# Patient Record
Sex: FEMALE | Race: WHITE | Employment: FULL TIME | URBAN - METROPOLITAN AREA
[De-identification: names, ages, dates, MRNs, and addresses within clinical notes are randomized per-mention and may not be internally consistent; named-entity substitution may affect disease eponyms.]

---

## 2019-10-22 ENCOUNTER — HOSPITAL ENCOUNTER (OUTPATIENT)
Dept: MRI IMAGING | Age: 39
Discharge: HOME | End: 2019-10-22
Payer: COMMERCIAL

## 2019-10-22 DIAGNOSIS — S93.699A PLANTAR FASCIA RUPTURE: ICD-10-CM

## 2019-10-22 PROCEDURE — 73718 MRI LOWER EXTREMITY W/O DYE: CPT

## 2019-11-03 ENCOUNTER — APPOINTMENT (OUTPATIENT)
Dept: CT IMAGING | Age: 39
End: 2019-11-03
Attending: EMERGENCY MEDICINE
Payer: COMMERCIAL

## 2019-11-03 ENCOUNTER — HOSPITAL ENCOUNTER (EMERGENCY)
Age: 39
Discharge: HOME | End: 2019-11-03
Attending: EMERGENCY MEDICINE
Payer: COMMERCIAL

## 2019-11-03 VITALS
RESPIRATION RATE: 18 BRPM | HEIGHT: 62 IN | TEMPERATURE: 98.6 F | HEART RATE: 109 BPM | SYSTOLIC BLOOD PRESSURE: 119 MMHG | DIASTOLIC BLOOD PRESSURE: 81 MMHG | BODY MASS INDEX: 26.31 KG/M2 | WEIGHT: 143 LBS | OXYGEN SATURATION: 100 %

## 2019-11-03 DIAGNOSIS — R53.83 FATIGUE, UNSPECIFIED TYPE: ICD-10-CM

## 2019-11-03 DIAGNOSIS — R11.2 NON-INTRACTABLE VOMITING WITH NAUSEA, UNSPECIFIED VOMITING TYPE: Primary | ICD-10-CM

## 2019-11-03 DIAGNOSIS — R51.9 ACUTE NONINTRACTABLE HEADACHE, UNSPECIFIED HEADACHE TYPE: ICD-10-CM

## 2019-11-03 LAB
ANION GAP SERPL CALC-SCNC: 8 MMOL/L (ref 5–15)
BASOPHILS # BLD: 0.1 K/UL (ref 0–0.1)
BASOPHILS NFR BLD: 0 % (ref 0–1)
BUN SERPL-MCNC: 8 MG/DL (ref 6–20)
BUN/CREAT SERPL: 11 (ref 12–20)
CALCIUM SERPL-MCNC: 8.8 MG/DL (ref 8.5–10.1)
CHLORIDE SERPL-SCNC: 110 MMOL/L (ref 97–108)
CO2 SERPL-SCNC: 23 MMOL/L (ref 21–32)
CREAT SERPL-MCNC: 0.7 MG/DL (ref 0.55–1.02)
DIFFERENTIAL METHOD BLD: ABNORMAL
EOSINOPHIL # BLD: 0 K/UL (ref 0–0.4)
EOSINOPHIL NFR BLD: 0 % (ref 0–7)
ERYTHROCYTE [DISTWIDTH] IN BLOOD BY AUTOMATED COUNT: 13.3 % (ref 11.5–14.5)
GLUCOSE SERPL-MCNC: 102 MG/DL (ref 65–100)
HCG UR QL: NEGATIVE
HCT VFR BLD AUTO: 43.8 % (ref 35–47)
HGB BLD-MCNC: 14 G/DL (ref 11.5–16)
IMM GRANULOCYTES # BLD AUTO: 0 K/UL (ref 0–0.04)
IMM GRANULOCYTES NFR BLD AUTO: 0 % (ref 0–0.5)
LYMPHOCYTES # BLD: 1.4 K/UL (ref 0.8–3.5)
LYMPHOCYTES NFR BLD: 13 % (ref 12–49)
MCH RBC QN AUTO: 26.4 PG (ref 26–34)
MCHC RBC AUTO-ENTMCNC: 32 G/DL (ref 30–36.5)
MCV RBC AUTO: 82.5 FL (ref 80–99)
MONOCYTES # BLD: 0.5 K/UL (ref 0–1)
MONOCYTES NFR BLD: 4 % (ref 5–13)
NEUTS SEG # BLD: 9.1 K/UL (ref 1.8–8)
NEUTS SEG NFR BLD: 83 % (ref 32–75)
NRBC # BLD: 0 K/UL (ref 0–0.01)
NRBC BLD-RTO: 0 PER 100 WBC
PLATELET # BLD AUTO: 477 K/UL (ref 150–400)
PMV BLD AUTO: 9.3 FL (ref 8.9–12.9)
POTASSIUM SERPL-SCNC: 3.9 MMOL/L (ref 3.5–5.1)
RBC # BLD AUTO: 5.31 M/UL (ref 3.8–5.2)
SODIUM SERPL-SCNC: 141 MMOL/L (ref 136–145)
WBC # BLD AUTO: 11.1 K/UL (ref 3.6–11)

## 2019-11-03 PROCEDURE — 74011250636 HC RX REV CODE- 250/636: Performed by: NURSE PRACTITIONER

## 2019-11-03 PROCEDURE — 85025 COMPLETE CBC W/AUTO DIFF WBC: CPT

## 2019-11-03 PROCEDURE — 96374 THER/PROPH/DIAG INJ IV PUSH: CPT

## 2019-11-03 PROCEDURE — 36415 COLL VENOUS BLD VENIPUNCTURE: CPT

## 2019-11-03 PROCEDURE — 70450 CT HEAD/BRAIN W/O DYE: CPT

## 2019-11-03 PROCEDURE — 99285 EMERGENCY DEPT VISIT HI MDM: CPT

## 2019-11-03 PROCEDURE — 81025 URINE PREGNANCY TEST: CPT

## 2019-11-03 PROCEDURE — 93005 ELECTROCARDIOGRAM TRACING: CPT

## 2019-11-03 PROCEDURE — 80048 BASIC METABOLIC PNL TOTAL CA: CPT

## 2019-11-03 PROCEDURE — 96375 TX/PRO/DX INJ NEW DRUG ADDON: CPT

## 2019-11-03 PROCEDURE — 74011250636 HC RX REV CODE- 250/636: Performed by: EMERGENCY MEDICINE

## 2019-11-03 PROCEDURE — 74011250637 HC RX REV CODE- 250/637: Performed by: EMERGENCY MEDICINE

## 2019-11-03 RX ORDER — METOPROLOL SUCCINATE 25 MG/1
25 TABLET, EXTENDED RELEASE ORAL
Status: COMPLETED | OUTPATIENT
Start: 2019-11-03 | End: 2019-11-03

## 2019-11-03 RX ORDER — KETOROLAC TROMETHAMINE 10 MG/1
10 TABLET, FILM COATED ORAL
Qty: 12 TAB | Refills: 0 | Status: SHIPPED | OUTPATIENT
Start: 2019-11-03 | End: 2020-11-05

## 2019-11-03 RX ORDER — ONDANSETRON 2 MG/ML
4 INJECTION INTRAMUSCULAR; INTRAVENOUS
Status: COMPLETED | OUTPATIENT
Start: 2019-11-03 | End: 2019-11-03

## 2019-11-03 RX ORDER — ONDANSETRON 4 MG/1
4 TABLET, ORALLY DISINTEGRATING ORAL
Qty: 10 TAB | Refills: 0 | Status: SHIPPED | OUTPATIENT
Start: 2019-11-03 | End: 2020-11-05

## 2019-11-03 RX ORDER — KETOROLAC TROMETHAMINE 30 MG/ML
15 INJECTION, SOLUTION INTRAMUSCULAR; INTRAVENOUS
Status: COMPLETED | OUTPATIENT
Start: 2019-11-03 | End: 2019-11-03

## 2019-11-03 RX ADMIN — KETOROLAC TROMETHAMINE 15 MG: 30 INJECTION, SOLUTION INTRAMUSCULAR at 15:17

## 2019-11-03 RX ADMIN — METOPROLOL SUCCINATE 25 MG: 25 TABLET, EXTENDED RELEASE ORAL at 15:25

## 2019-11-03 RX ADMIN — SODIUM CHLORIDE 1000 ML: 900 INJECTION, SOLUTION INTRAVENOUS at 15:21

## 2019-11-03 RX ADMIN — ONDANSETRON 4 MG: 2 INJECTION INTRAMUSCULAR; INTRAVENOUS at 15:27

## 2019-11-03 NOTE — ED PROVIDER NOTES
42 y.o. female with significant past medical history for SVT who presents non ambulatory to ED with chief complaint of emesis. Pt reports severe N/V since this morning, has had 5 episodes since 1100 this morning. Last emesis episode in ED waiting room. Also associated headache, mild abdominal pain secondary to emesis. Pt works at a  center. Just moved to [place] [address] 3 weeks ago. Denies chance of pregnancy     PCP: None    Note written by Alex Rashid, as dictated by Chucho Hua NP 12:49 PM.     42 y.o. female with past medical history significant for SVT who presents via private vehicle to the ED with chief complaint of vomiting. Patient reports that this morning she began having a worsening headache to the top of her head with no radiation to the neck. Patient states that afterwards, she began feeling nauseous and had 7 episodes of vomiting. Patient states that at baseline, she has frequent fatigue and headaches and that her heart rate is usually fast. She denies any history of migraines and states that she sees a rheumatologist for her chronic symptoms. 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 Patient notes that she has had 3 bowel movements today, but that the stool is fully formed. Patient endorses use of 25 mg metoprolol once a day for SVT, but states that she has not taken her dose for the past three days because she forgot. Patient notes that she also has psoriasis for which she does not take medications. Patient's LMP was 6 months ago and she is compliant with birth control She notes that for the past one month, \"the light has been bothering her eyes\" for about a month. She denies history of blood clots. Patient endorses chills, shakiness, and lightheadedness. Patient denies shortness of breath, fever, diarrhea, dizziness, tick bites, hematemesis or abdominal pain.     There are no other acute medical concerns at this time. Patient just moved to [place] [address] three weeks ago    PCP: None    Note written by Alex Dunn, as dictated by Orlando Daly MD 2:29 PM      The history is provided by the patient and a parent. No  was used. No past medical history on file. No past surgical history on file. No family history on file. Social History     Socioeconomic History    Marital status:      Spouse name: Not on file    Number of children: Not on file    Years of education: Not on file    Highest education level: Not on file   Occupational History    Not on file   Social Needs    Financial resource strain: Not on file    Food insecurity:     Worry: Not on file     Inability: Not on file    Transportation needs:     Medical: Not on file     Non-medical: Not on file   Tobacco Use    Smoking status: Not on file   Substance and Sexual Activity    Alcohol use: Not on file    Drug use: Not on file    Sexual activity: Not on file   Lifestyle    Physical activity:     Days per week: Not on file     Minutes per session: Not on file    Stress: Not on file   Relationships    Social connections:     Talks on phone: Not on file     Gets together: Not on file     Attends Synagogue service: Not on file     Active member of club or organization: Not on file     Attends meetings of clubs or organizations: Not on file     Relationship status: Not on file    Intimate partner violence:     Fear of current or ex partner: Not on file     Emotionally abused: Not on file     Physically abused: Not on file     Forced sexual activity: Not on file   Other Topics Concern    Not on file   Social History Narrative    Not on file         ALLERGIES: Patient has no allergy information on record. Review of Systems   Constitutional: Positive for chills. Respiratory: Negative for shortness of breath. Cardiovascular: Negative for chest pain.    Gastrointestinal: Positive for nausea and vomiting. Neurological: Positive for light-headedness and headaches. Negative for dizziness. All other systems reviewed and are negative. Vitals:    11/03/19 1256   BP: (!) 132/94   Pulse: (!) 115   Resp: 18   Temp: 98.6 °F (37 °C)   SpO2: 100%   Weight: 64.9 kg (143 lb)   Height: 5' 2\" (1.575 m)            Physical Exam   Constitutional: She is oriented to person, place, and time. She appears well-developed and well-nourished. No distress. HENT:   Head: Normocephalic and atraumatic. Right Ear: External ear normal.   Left Ear: External ear normal.   Mouth/Throat: Oropharynx is clear and moist. No oropharyngeal exudate. Eyes: Pupils are equal, round, and reactive to light. EOM are normal. No scleral icterus. Neck: Normal range of motion. Neck supple. No thyromegaly present. Cardiovascular: Regular rhythm, S1 normal, S2 normal, normal heart sounds and intact distal pulses. Tachycardia present. No murmur heard. Pulmonary/Chest: Effort normal and breath sounds normal. No stridor. No respiratory distress. She has no wheezes. She has no rales. Abdominal: Soft. Bowel sounds are normal. She exhibits no distension and no mass. There is no tenderness. There is no guarding. Musculoskeletal: Normal range of motion. She exhibits no edema. Neurological: She is alert and oriented to person, place, and time. No cranial nerve deficit. Coordination normal.   Skin: Skin is warm and dry. No rash noted. She is not diaphoretic. Patchy psoriatic plaques to patients scalp and body    Psychiatric:   anxious   Nursing note and vitals reviewed. Note written by Alex Cox, as dictated by Alessio Forde NP 12:49 PM.     MDM  Number of Diagnoses or Management Options  Acute nonintractable headache, unspecified headache type:    Fatigue, unspecified type:   Non-intractable vomiting with nausea, unspecified vomiting type:   Diagnosis management comments: Given sx of light sensitivity for a month and no fevers not on immunosuppressants doubt meningitis. Could be that pt has migraines and SOSA caused vomiting. 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 Has had more stools than normal today so this could be viral    She has a multitude of complaints including hair loss and not feeling well. She is already seeing her rheumatologist who natalie bw . She moved here from [place] about 3 weeks ago but already has a rheumatologist.  She does not have a primary care however. No cp or sob to be concerned for PE. She reports resting hr always high and she missed her metoprolol the past 3 days which is likely what is causing tachycardia. Will check ekg to ensure sinus        Amount and/or Complexity of Data Reviewed  Clinical lab tests: ordered and reviewed  Tests in the radiology section of CPT®: reviewed and ordered  Obtain history from someone other than the patient: yes (mom)  Independent visualization of images, tracings, or specimens: yes (ekg)    Patient Progress  Patient progress: stable         Procedures    ED EKG interpretation:  Rhythm: sinus tachycardia; and regular . Rate (approx.): 110; Axis: normal; P wave: normal; QRS interval: normal ; ST/T wave: non-specific changes  EKG documented by Tay Brooks MD, scribe, as interpreted by Marilynn Castro MD, ED MD.      PROGRESS NOTE:  4:11 PM  Feels better, headaches are improved and nausea is improved. We discussed at length importance of follow-up. I have given her the list of primary care providers that we have here in the emergency department. I also discussed if her symptoms worsen, her headaches worsen or she develops fevers to return however does not sound like these headaches are new. She is not on any current immunosuppressants for her psoriasis as she had developed lupus due to them a few years ago and everything has been stopped.   She reports however in her most recent blood work done by her rheumatologist, she no longer has lupus. 4:11 PM  Patient's results have been reviewed with them. Patient and/or family have verbally conveyed their understanding and agreement of the patient's signs, symptoms, diagnosis, treatment and prognosis and additionally agree to follow up as recommended or return to the Emergency Room should their condition change prior to follow-up. Discharge instructions have also been provided to the patient with some educational information regarding their diagnosis as well a list of reasons why they would want to return to the ER prior to their follow-up appointment should their condition change. 1. Non-intractable vomiting with nausea, unspecified vomiting type    2. Acute nonintractable headache, unspecified headache type    3.  Fatigue, unspecified type

## 2019-11-03 NOTE — DISCHARGE INSTRUCTIONS
Patient Education        Fatigue: Care Instructions  Your Care Instructions    Fatigue is a feeling of tiredness, exhaustion, or lack of energy. You may feel fatigue because of too much or not enough activity. It can also come from stress, lack of sleep, boredom, and poor diet. Many medical problems, such as viral infections, can cause fatigue. Emotional problems, especially depression, are often the cause of fatigue. Fatigue is most often a symptom of another problem. Treatment for fatigue depends on the cause. For example, if you have fatigue because you have a certain health problem, treating this problem also treats your fatigue. If depression or anxiety is the cause, treatment may help. Follow-up care is a key part of your treatment and safety. Be sure to make and go to all appointments, and call your doctor if you are having problems. It's also a good idea to know your test results and keep a list of the medicines you take. How can you care for yourself at home? · Get regular exercise. But don't overdo it. Go back and forth between rest and exercise. · Get plenty of rest.  · Eat a healthy diet. Do not skip meals, especially breakfast.  · Reduce your use of caffeine, tobacco, and alcohol. Caffeine is most often found in coffee, tea, cola drinks, and chocolate. · Limit medicines that can cause fatigue. This includes tranquilizers and cold and allergy medicines. When should you call for help? Watch closely for changes in your health, and be sure to contact your doctor if:    · You have new symptoms such as fever or a rash.     · Your fatigue gets worse.     · You have been feeling down, depressed, or hopeless. Or you may have lost interest in things that you usually enjoy.     · You are not getting better as expected. Where can you learn more? Go to http://susan-harlan.info/. Enter F240 in the search box to learn more about \"Fatigue: Care Instructions. \"  Current as of: June 26, 2019  Content Version: 12.2  © 9129-4315 [employer]. Care instructions adapted under license by [employer] (which disclaims liability or warranty for this information). If you have questions about a medical condition or this instruction, always ask your healthcare professional. [address] any warranty or liability for your use of this information. Patient Education        Headache: Care Instructions  Your Care Instructions    Headaches have many possible causes. Most headaches aren't a sign of a more serious problem, and they will get better on their own. Home treatment may help you feel better faster. The doctor has checked you carefully, but problems can develop later. If you notice any problems or new symptoms, get medical treatment right away. Follow-up care is a key part of your treatment and safety. Be sure to make and go to all appointments, and call your doctor if you are having problems. It's also a good idea to know your test results and keep a list of the medicines you take. How can you care for yourself at home? · Do not drive if you have taken a prescription pain medicine. · Rest in a quiet, dark room until your headache is gone. Close your eyes and try to relax or go to sleep. Don't watch TV or read. · Put a cold, moist cloth or cold pack on the painful area for 10 to 20 minutes at a time. Put a thin cloth between the cold pack and your skin. · Use a warm, moist towel or a heating pad set on low to relax tight shoulder and neck muscles. · Have someone gently massage your neck and shoulders. · Take pain medicines exactly as directed. ? If the doctor gave you a prescription medicine for pain, take it as prescribed. ? If you are not taking a prescription pain medicine, ask your doctor if you can take an over-the-counter medicine.   · Be careful not to take pain medicine more often than the instructions allow, because you may get worse or more frequent headaches when the medicine wears off. · Do not ignore new symptoms that occur with a headache, such as a fever, weakness or numbness, vision changes, or confusion. These may be signs of a more serious problem. To prevent headaches  · Keep a headache diary so you can figure out what triggers your headaches. Avoiding triggers may help you prevent headaches. Record when each headache began, how long it lasted, and what the pain was like (throbbing, aching, stabbing, or dull). Write down any other symptoms you had with the headache, such as nausea, flashing lights or dark spots, or sensitivity to bright light or loud noise. Note if the headache occurred near your period. List anything that might have triggered the headache, such as certain foods (chocolate, cheese, wine) or odors, smoke, bright light, stress, or lack of sleep. · Find healthy ways to deal with stress. Headaches are most common during or right after stressful times. Take time to relax before and after you do something that has caused a headache in the past.  · Try to keep your muscles relaxed by keeping good posture. Check your jaw, face, neck, and shoulder muscles for tension, and try relaxing them. When sitting at a desk, change positions often, and stretch for 30 seconds each hour. · Get plenty of sleep and exercise. · Eat regularly and well. Long periods without food can trigger a headache. · Treat yourself to a massage. Some people find that regular massages are very helpful in relieving tension. · Limit caffeine by not drinking too much coffee, tea, or soda. But don't quit caffeine suddenly, because that can also give you headaches. · Reduce eyestrain from computers by blinking frequently and looking away from the computer screen every so often. Make sure you have proper eyewear and that your monitor is set up properly, about an arm's length away. · Seek help if you have depression or anxiety.  Your headaches may be linked to these conditions. Treatment can both prevent headaches and help with symptoms of anxiety or depression. When should you call for help? Call 911 anytime you think you may need emergency care. For example, call if:    · You have signs of a stroke. These may include:  ? Sudden numbness, paralysis, or weakness in your face, arm, or leg, especially on only one side of your body. ? Sudden vision changes. ? Sudden trouble speaking. ? Sudden confusion or trouble understanding simple statements. ? Sudden problems with walking or balance. ? A sudden, severe headache that is different from past headaches.    Call your doctor now or seek immediate medical care if:    · You have a new or worse headache.     · Your headache gets much worse. Where can you learn more? Go to http://susan-harlan.info/. Enter M271 in the search box to learn more about \"Headache: Care Instructions. \"  Current as of: March 28, 2019  Content Version: 12.2  © 5096-7941 [employer]. Care instructions adapted under license by [employer] (which disclaims liability or warranty for this information). If you have questions about a medical condition or this instruction, always ask your healthcare professional. [place] [address] any warranty or liability for your use of this information. Patient Education        Nausea and Vomiting: Care Instructions  Your Care Instructions    When you are nauseated, you may feel weak and sweaty and notice a lot of saliva in your mouth. Nausea often leads to vomiting. Most of the time you do not need to worry about nausea and vomiting, but they can be signs of other illnesses. Two common causes of nausea and vomiting are stomach flu and food poisoning. Nausea and vomiting from viral stomach flu will usually start to improve within 24 hours. Nausea and vomiting from food poisoning may last from 12 to 48 hours.   The doctor has checked you carefully, but problems can develop later. If you notice any problems or new symptoms, get medical treatment right away. Follow-up care is a key part of your treatment and safety. Be sure to make and go to all appointments, and call your doctor if you are having problems. It's also a good idea to know your test results and keep a list of the medicines you take. How can you care for yourself at home? · To prevent dehydration, drink plenty of fluids, enough so that your urine is light yellow or clear like water. Choose water and other caffeine-free clear liquids until you feel better. If you have kidney, heart, or liver disease and have to limit fluids, talk with your doctor before you increase the amount of fluids you drink. · Rest in bed until you feel better. · When you are able to eat, try clear soups, mild foods, and liquids until all symptoms are gone for 12 to 48 hours. Other good choices include dry toast, crackers, cooked cereal, and gelatin dessert, such as Jell-O. When should you call for help? Call 911 anytime you think you may need emergency care. For example, call if:    · You passed out (lost consciousness).    Call your doctor now or seek immediate medical care if:    · You have symptoms of dehydration, such as:  ? Dry eyes and a dry mouth. ? Passing only a little dark urine. ? Feeling thirstier than usual.     · You have new or worsening belly pain.     · You have a new or higher fever.     · You vomit blood or what looks like coffee grounds.    Watch closely for changes in your health, and be sure to contact your doctor if:    · You have ongoing nausea and vomiting.     · Your vomiting is getting worse.     · Your vomiting lasts longer than 2 days.     · You are not getting better as expected. Where can you learn more? Go to http://susan-harlan.info/. Enter 25 [id number] in the search box to learn more about \"Nausea and Vomiting: Care Instructions. \"  Current as of: June 26, 2019  Content Version: 12.2  © 2683-1901 [employer]. Care instructions adapted under license by [employer] (which disclaims liability or warranty for this information). If you have questions about a medical condition or this instruction, always ask your healthcare professional. [address] any warranty or liability for your use of this information.

## 2019-11-04 LAB
ATRIAL RATE: 110 BPM
CALCULATED P AXIS, ECG09: 64 DEGREES
CALCULATED R AXIS, ECG10: 53 DEGREES
CALCULATED T AXIS, ECG11: 35 DEGREES
DIAGNOSIS, 93000: NORMAL
P-R INTERVAL, ECG05: 146 MS
Q-T INTERVAL, ECG07: 326 MS
QRS DURATION, ECG06: 74 MS
QTC CALCULATION (BEZET), ECG08: 441 MS
VENTRICULAR RATE, ECG03: 110 BPM

## 2020-01-08 ENCOUNTER — APPOINTMENT (OUTPATIENT)
Dept: ULTRASOUND IMAGING | Age: 40
End: 2020-01-08
Attending: EMERGENCY MEDICINE
Payer: COMMERCIAL

## 2020-01-08 ENCOUNTER — HOSPITAL ENCOUNTER (EMERGENCY)
Age: 40
Discharge: HOME | End: 2020-01-08
Attending: EMERGENCY MEDICINE | Admitting: EMERGENCY MEDICINE
Payer: COMMERCIAL

## 2020-01-08 ENCOUNTER — APPOINTMENT (OUTPATIENT)
Dept: GENERAL RADIOLOGY | Age: 40
End: 2020-01-08
Attending: EMERGENCY MEDICINE
Payer: COMMERCIAL

## 2020-01-08 VITALS
HEART RATE: 98 BPM | DIASTOLIC BLOOD PRESSURE: 72 MMHG | TEMPERATURE: 98.2 F | RESPIRATION RATE: 12 BRPM | WEIGHT: 147 LBS | OXYGEN SATURATION: 100 % | HEIGHT: 62 IN | SYSTOLIC BLOOD PRESSURE: 116 MMHG | BODY MASS INDEX: 27.05 KG/M2

## 2020-01-08 DIAGNOSIS — R07.9 CHEST PAIN, UNSPECIFIED TYPE: Primary | ICD-10-CM

## 2020-01-08 LAB — TROPONIN I BLD-MCNC: <0.04 NG/ML (ref 0–0.08)

## 2020-01-08 PROCEDURE — 93005 ELECTROCARDIOGRAM TRACING: CPT

## 2020-01-08 PROCEDURE — 99283 EMERGENCY DEPT VISIT LOW MDM: CPT

## 2020-01-08 PROCEDURE — 84484 ASSAY OF TROPONIN QUANT: CPT

## 2020-01-08 PROCEDURE — 71046 X-RAY EXAM CHEST 2 VIEWS: CPT

## 2020-01-08 PROCEDURE — 76705 ECHO EXAM OF ABDOMEN: CPT

## 2020-01-08 NOTE — ED TRIAGE NOTES
Pt here for lateral left sided chest pain starting last night. Described pain as \"lightening bolt\". Pain is not reproducible. Denies SOB. HX of SVT. Seen at urgent care this a.m. and referred to ED.

## 2020-01-08 NOTE — DISCHARGE INSTRUCTIONS

## 2020-01-08 NOTE — ED PROVIDER NOTES
42 y.o. female with past medical history significant for SVT who presents ambulatory to the ED with chief complaint of chest pain. Patient reports onset of sharp left chest pain since yesterday that is worsening today. Pain is not reproducible. She states that she went to urgent care today, had a low D-dimer and was referred to the ED. Patient states that she is on birth control pill for past 1 year and that her last LMP was 8 months ago. Of note, patient has recently moved from [place] and was followed up by cardiology for history of SVT. She notes that she was on cephalexin for toe infection. Patient denies shortness of breath or tobacco use. There are no other acute medical concerns at this time. Social Hx: denies Tobacco use  PCP: None    Note written by Alex Aranda, as dictated by Koki Myers NP 11:27 AM      The history is provided by the patient. No  was used. EKG at [facility] revealed sinus tachycardia with a ventricular rate of 105; without ectopy. No past medical history on file. No past surgical history on file. No family history on file.     Social History     Socioeconomic History    Marital status:      Spouse name: Not on file    Number of children: Not on file    Years of education: Not on file    Highest education level: Not on file   Occupational History    Not on file   Social Needs    Financial resource strain: Not on file    Food insecurity:     Worry: Not on file     Inability: Not on file    Transportation needs:     Medical: Not on file     Non-medical: Not on file   Tobacco Use    Smoking status: Not on file   Substance and Sexual Activity    Alcohol use: Not on file    Drug use: Not on file    Sexual activity: Not on file   Lifestyle    Physical activity:     Days per week: Not on file     Minutes per session: Not on file    Stress: Not on file   Relationships    Social connections:     Talks on phone: Not on file Gets together: Not on file     Attends Latter-day service: Not on file     Active member of club or organization: Not on file     Attends meetings of clubs or organizations: Not on file     Relationship status: Not on file    Intimate partner violence:     Fear of current or ex partner: Not on file     Emotionally abused: Not on file     Physically abused: Not on file     Forced sexual activity: Not on file   Other Topics Concern    Not on file   Social History Narrative    Not on file         ALLERGIES: Ciara Grant [tofacitinib]; Aspirin; Azithromycin; Contrast agent [iodine]; Pcn [penicillins]; and Sulfa (sulfonamide antibiotics)    Review of Systems   Constitutional: Negative for activity change, appetite change, fever and unexpected weight change. HENT: Negative for congestion, sore throat and trouble swallowing. Eyes: Negative for visual disturbance. Respiratory: Negative for cough and shortness of breath. Cardiovascular: Positive for chest pain. Negative for palpitations and leg swelling. Gastrointestinal: Negative for abdominal pain, constipation, nausea and vomiting. Genitourinary: Negative for dysuria. Musculoskeletal: Negative for back pain and neck pain. Skin: Negative for rash. Neurological: Negative for dizziness, light-headedness and headaches. All other systems reviewed and are negative. Vitals:    01/08/20 1127   BP: 116/72   Pulse: 98   Resp: 12   Temp: 98.2 °F (36.8 °C)   SpO2: 100%   Weight: 66.7 kg (147 lb)   Height: 5' 2\" (1.575 m)            Physical Exam  Vitals signs and nursing note reviewed. Constitutional:       General: She is not in acute distress. Appearance: She is well-developed. She is not ill-appearing, toxic-appearing or diaphoretic. Neck:      Musculoskeletal: Normal range of motion and neck supple. Cardiovascular:      Rate and Rhythm: Normal rate and regular rhythm. Heart sounds: Normal heart sounds.    Pulmonary:      Effort: Pulmonary effort is normal.      Breath sounds: Normal breath sounds. Chest:      Chest wall: Tenderness present. Comments: Reports deep intermittent left sided chest pain; Skin integrity is intact. There is no obvious bony deformity, bruising or erythema. Good neurovascular sensation. Abdominal:      Palpations: Abdomen is soft. Tenderness: There is no tenderness. There is no guarding or rebound. Musculoskeletal: Normal range of motion. Lymphadenopathy:      Cervical: No cervical adenopathy. Skin:     General: Skin is warm and dry. Findings: No rash. Neurological:      Mental Status: She is alert. MDM       Procedures      Pt has been re-examined and is presently pain free. 2:14 PM  Patient's results and plan of care have been reviewed with her and her mom. .  Patient and/or family have verbally conveyed their understanding and agreement of the patient's signs, symptoms, diagnosis, treatment and prognosis and additionally agree to follow up as recommended or return to the Emergency Room should their condition change prior to follow-up. Discharge instructions have also been provided to the patient with some educational information regarding her diagnosis as well a list of reasons why she would want to return to the ER prior to her follow-up appointment should her condition change. Arcenio Garcia NP

## 2020-01-09 ENCOUNTER — OFFICE VISIT (OUTPATIENT)
Dept: CARDIOLOGY CLINIC | Age: 40
End: 2020-01-09

## 2020-01-09 VITALS
OXYGEN SATURATION: 99 % | WEIGHT: 143 LBS | HEART RATE: 92 BPM | RESPIRATION RATE: 18 BRPM | HEIGHT: 62 IN | DIASTOLIC BLOOD PRESSURE: 68 MMHG | BODY MASS INDEX: 26.31 KG/M2 | SYSTOLIC BLOOD PRESSURE: 122 MMHG

## 2020-01-09 DIAGNOSIS — R07.9 CHEST PAIN, UNSPECIFIED TYPE: Primary | ICD-10-CM

## 2020-01-09 DIAGNOSIS — I47.1 SVT (SUPRAVENTRICULAR TACHYCARDIA) (HCC): ICD-10-CM

## 2020-01-09 LAB
ATRIAL RATE: 88 BPM
CALCULATED P AXIS, ECG09: 52 DEGREES
CALCULATED R AXIS, ECG10: 54 DEGREES
CALCULATED T AXIS, ECG11: 30 DEGREES
DIAGNOSIS, 93000: NORMAL
P-R INTERVAL, ECG05: 118 MS
Q-T INTERVAL, ECG07: 336 MS
QRS DURATION, ECG06: 74 MS
QTC CALCULATION (BEZET), ECG08: 406 MS
VENTRICULAR RATE, ECG03: 88 BPM

## 2020-01-09 RX ORDER — NORETHINDRONE ACETATE AND ETHINYL ESTRADIOL, ETHINYL ESTRADIOL AND FERROUS FUMARATE 1MG-10(24)
KIT ORAL
COMMUNITY
Start: 2020-01-04

## 2020-01-09 NOTE — PROGRESS NOTES
Cristina Pimentel MD. [facility]              Patient: Dorene Gruber  : 1980      Today's Date: 2020            HISTORY OF PRESENT ILLNESS:     History of Present Illness:    Referred from ER for chest pain    ER note yesterday states - \"40 y.o. female with past medical history significant for SVT who presents ambulatory to the ED with chief complaint of chest pain. Patient reports onset of sharp left chest pain since yesterday that is worsening today. Pain is not reproducible. She states that she went to urgent care today, had a low D-dimer and was referred to the ED. Patient states that she is on birth control pill for past 1 year and that her last LMP was 8 months ago. Of note, patient has recently moved from [place] and was followed up by cardiology for history of SVT. She notes that she was on cephalexin for toe infection. Patient denies shortness of breath or tobacco use. \"    Tuesday night had sharp, lightening bolt pain - went to bed - in AM it was still there - went to [facility] and then ER. Today feels better but still has a dull pain - constant. Some SOB. She has a history of palpitations / SVT - was on metoprolol.          PAST MEDICAL HISTORY:     Past Medical History:   Diagnosis Date    Psoriasis     Psoriatic arthritis ([address]     SV[facility])            MEDICATIONS:     Current Outpatient Medications   Medication Sig Dispense Refill    LO LOESTRIN FE 1 mg-10 mcg (24)/10 mcg (2) tab TK 1 T PO D         Allergies   Allergen Reactions    Aspirin Hives    Azithromycin Hives    Humira [Adalimumab] Hives    Penicillins Hives    Remicade [Infliximab] Hives    Sulfa (Sulfonamide Antibiotics) Hives             SOCIAL HISTORY:     Social History     Tobacco Use    Smoking status: Never Smoker    Smokeless tobacco: Never Used   Substance Use Topics    Alcohol use: Yes     Comment: occasionally    Drug use: Not on file         FAMILY HISTORY:     Family History   Problem Relation Age of Onset    Hypertension Mother     Arrhythmia Sister         SVT    Heart Disease Maternal Uncle              REVIEW OF SYMPTOMS:     Review of Symptoms:  Constitutional: Negative for fever, chills  HEENT: Negative for nosebleeds, tinnitus, and vision changes. Respiratory: Negative for cough, wheezing  Cardiovascular: Negative for orthopnea, claudication, syncope, and PND. Gastrointestinal: Negative for abdominal pain, diarrhea, melena. Genitourinary: Negative for dysuria  Musculoskeletal: Negative for myalgias. + joint pain. Skin: Negative for rash  Heme: No problems bleeding. Bruises easily. Neurological: Negative for speech change and focal weakness. PHYSICAL EXAM:     Physical Exam:  Visit Vitals  /68 (BP 1 Location: Left arm, BP Patient Position: Sitting)   Pulse 92   Resp 18   Ht 5' 2\" (1.575 m)   Wt 143 lb (64.9 kg)   SpO2 99%   BMI 26.16 kg/m²     Patient appears generally well, mood and affect are appropriate and pleasant. HEENT:  Hearing intact, non-icteric, normocephalic, atraumatic. Neck Exam: Supple, No JVD or carotid bruits. Lung Exam: Clear to auscultation, even breath sounds. Cardiac Exam: Regular rate and rhythm with no murmur or rub  Abdomen: Soft, non-tender, normal bowel sounds. No bruits or masses. Extremities: Moves all ext well. No lower extremity edema. Vascular: 2+ dorsalis pedis pulses bilaterally.   Psych: Appropriate affect  Neuro - Grossly intact        LABS / OTHER STUDIES:       Component      Latest Ref Rng & Units 1/8/2020 11/3/2019 11/3/2019 11/3/2019          11:42 AM  1:25 PM  1:04 PM  1:04 PM   WBC      3.6 - 11.0 K/uL    11.1 (H)   RBC      3.80 - 5.20 M/uL    5.31 (H)   HGB      11.5 - 16.0 g/dL    14.0   HCT      35.0 - 47.0 %    43.8   MCV      80.0 - 99.0 FL    82.5   MCH      26.0 - 34.0 PG    26.4   MCHC      30.0 - 36.5 g/dL    32.0   RDW      11.5 - 14.5 %    13.3   PLATELET      375 - 511 K/uL 477 (H)   MPV      8.9 - 12.9 FL    9.3   NRBC      0  WBC    0.0   ABSOLUTE NRBC      0.00 - 0.01 K/uL    0.00   NEUTROPHILS      32 - 75 %    83 (H)   LYMPHOCYTES      12 - 49 %    13   MONOCYTES      5 - 13 %    4 (L)   EOSINOPHILS      0 - 7 %    0   BASOPHILS      0 - 1 %    0   IMMATURE GRANULOCYTES      0.0 - 0.5 %    0   ABS. NEUTROPHILS      1.8 - 8.0 K/UL    9.1 (H)   ABS. LYMPHOCYTES      0.8 - 3.5 K/UL    1.4   ABS. MONOCYTES      0.0 - 1.0 K/UL    0.5   ABS. EOSINOPHILS      0.0 - 0.4 K/UL    0.0   ABS. BASOPHILS      0.0 - 0.1 K/UL    0.1   ABS. IMM. GRANS.      0.00 - 0.04 K/UL    0.0   DF          AUTOMATED   Sodium      136 - 145 mmol/L   141    Potassium      3.5 - 5.1 mmol/L   3.9    Chloride      97 - 108 mmol/L   110 (H)    CO2      21 - 32 mmol/L   23    Anion gap      5 - 15 mmol/L   8    Glucose      65 - 100 mg/dL   102 (H)    BUN      6 - 20 MG/DL   8    Creatinine      0.55 - 1.02 MG/DL   0.70    BUN/Creatinine ratio      12 - 20     11 (L)    GFR est AA      >60 ml/min/1.73m2   >60    GFR est non-AA      >60 ml/min/1.73m2   >60    Calcium      8.5 - 10.1 MG/DL   8.8    Pregnancy test,urine (POC)      NEG    NEGATIVE     Troponin-I (POC)      0.00 - 0.08 ng/mL <0.04          Labs 1/8/20 - [facility] - D-dimer and trop normal, CBC and CMP OK       CARDIAC DIAGNOSTICS:     Cardiac Evaluation Includes:    EKG 1/8/20 - sinus tach, normal     EKG 1/9/20 - NSR, normal     Abd U/S 1/8/20 - Unremarkable right upper quadrant ultrasound. CXR 1/8/20 - No acute process.       ASSESSMENT AND PLAN:     Assessment and Plan:  1) Non-cardiac chest pain   - Her symptoms have been atypical for angina  - Labs (Trop, D-dimer), CXR, and EKG all have been normal   - Will check an echo and treadmill stress test to rule out heart disease     2) History of SVT / palpitations   - She has had some palpitations, but feels OK without metoprolol   - Will get records from her [place] cardiologist to review     3) Psoriatic arthritis   - To see Rheum     4) Phone FU after testing. See me in one month. Patient expressed understanding of the plan - questions were answered. Used to live in [place]. 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 Arturo Diaz MD, [address]. [address], [address]  [address]  Ph: [phone]    [phone]      ADDENDUM   1/19/2020  Prior records reviewed from [place] and chart updated. Was seen for SVT, palpitations. Was unable to tolerate diltiazem. Tolerated metoprolol.        Echo July 2014 - LVEF 67%, mild MVP of anterior leaflet, trace MR  Echo May 2016 - LVEF 55-60%, mild MVP anterior leaflet, no MR  Stress test 6/16 - walked 9 min, normal ETT study   Event monitor June 2016 - sinus, 4 and 10 beat run of SVT   Holter 4/27/17 - NSR, brief narrow complex tachycardia, likely sinus tach, cannot rule out atrial tachycardia   Echo 2/21/18 - LVEF 60%, MV leaflets appear normal with trace MR

## 2020-01-09 NOTE — PROGRESS NOTES
Greg Multani is a 42 y.o. female    Chief Complaint   Patient presents with    New Patient    SVT     Pt was on metoprolol 25 mg BID, she hasn't taken it for 3 months. Visit Vitals  /68 (BP 1 Location: Left arm, BP Patient Position: Sitting)   Pulse 92   Resp 18   Ht 5' 2\" (1.575 m)   Wt 143 lb (64.9 kg)   SpO2 99%   BMI 26.16 kg/m²       1. Have you been to the ER, urgent care clinic since your last visit? Hospitalized since your last visit? YES, [facility] for CP 1/8/2020    2. Have you seen or consulted any other health care providers outside of the [address] [place] [address] since your last visit? Include any pap smears or colon screening. Cardiology : Dr. Azalea Lin Phone # [phone].

## 2020-11-05 ENCOUNTER — APPOINTMENT (OUTPATIENT)
Dept: GENERAL RADIOLOGY | Age: 40
End: 2020-11-05
Attending: EMERGENCY MEDICINE
Payer: COMMERCIAL

## 2020-11-05 ENCOUNTER — HOSPITAL ENCOUNTER (EMERGENCY)
Age: 40
Discharge: HOME | End: 2020-11-05
Attending: EMERGENCY MEDICINE
Payer: COMMERCIAL

## 2020-11-05 VITALS
DIASTOLIC BLOOD PRESSURE: 75 MMHG | OXYGEN SATURATION: 98 % | TEMPERATURE: 98.1 F | HEART RATE: 83 BPM | RESPIRATION RATE: 16 BRPM | SYSTOLIC BLOOD PRESSURE: 126 MMHG | BODY MASS INDEX: 28.48 KG/M2 | HEIGHT: 62 IN | WEIGHT: 154.76 LBS

## 2020-11-05 DIAGNOSIS — J06.9 VIRAL URI WITH COUGH: Primary | ICD-10-CM

## 2020-11-05 DIAGNOSIS — R06.02 SOB (SHORTNESS OF BREATH): ICD-10-CM

## 2020-11-05 DIAGNOSIS — R09.82 POST-NASAL DRIP: ICD-10-CM

## 2020-11-05 DIAGNOSIS — J20.9 ACUTE BRONCHITIS, UNSPECIFIED ORGANISM: ICD-10-CM

## 2020-11-05 PROCEDURE — 71046 X-RAY EXAM CHEST 2 VIEWS: CPT

## 2020-11-05 PROCEDURE — 99283 EMERGENCY DEPT VISIT LOW MDM: CPT

## 2020-11-05 PROCEDURE — 93005 ELECTROCARDIOGRAM TRACING: CPT

## 2020-11-05 RX ORDER — ALBUTEROL SULFATE 90 UG/1
2 AEROSOL, METERED RESPIRATORY (INHALATION)
Qty: 1 INHALER | Refills: 0 | Status: SHIPPED | OUTPATIENT
Start: 2020-11-05

## 2020-11-05 RX ORDER — DOXYCYCLINE HYCLATE 100 MG
100 TABLET ORAL 2 TIMES DAILY
Qty: 14 TAB | Refills: 0 | Status: SHIPPED | OUTPATIENT
Start: 2020-11-05 | End: 2020-11-12

## 2020-11-05 RX ORDER — GUAIFENESIN 1200 MG/1
1200 TABLET, EXTENDED RELEASE ORAL 2 TIMES DAILY
Qty: 10 TAB | Refills: 0 | Status: SHIPPED | OUTPATIENT
Start: 2020-11-05 | End: 2021-01-29

## 2020-11-05 RX ORDER — PSEUDOEPHEDRINE HCL 30 MG
30 TABLET ORAL
Qty: 15 TAB | Refills: 0 | Status: SHIPPED | OUTPATIENT
Start: 2020-11-05 | End: 2020-11-08

## 2020-11-05 RX ORDER — FLUTICASONE PROPIONATE 50 MCG
2 SPRAY, SUSPENSION (ML) NASAL DAILY
Qty: 1 BOTTLE | Refills: 0 | Status: SHIPPED | OUTPATIENT
Start: 2020-11-05

## 2020-11-05 NOTE — ED TRIAGE NOTES
Patient was treated for an infection on Friday, and started on Clindamycin but it caused nausea so she stopped it. On Tuesday she went to an Urgent care for a cough and SOB. Negative COVID test. They could not do a CXR. Came here for a CXR.

## 2020-11-06 NOTE — ED NOTES
The patient was discharged home by Dr Barbara Torrez in stable condition. The patient is alert and oriented, in no respiratory distress. The patient's diagnosis, condition and treatment were explained. The patient expressed understanding. A discharge plan has been developed. A  was not involved in the process. Aftercare instructions were given. Pt ambulatory out of the ED.

## 2020-11-06 NOTE — ED PROVIDER NOTES
42-year-old female presenting the ER with report of cough and shortness of breath. Patient started having symptoms on Tuesday, 2 days ago and went to an urgent care facility and had a negative COVID-19 test.  Patient reports that they could not perform a chest x-ray at that time. Reports having some nasal congestion but a nonproductive cough. No reported fevers or chills or myalgias. No loss of taste or smell. No dysuria. However last Friday was having some mild flank pain and her doctor started treating her for GSB clindamycin. Reports that the clindamycin made her sick so she stopped taking it. Patient presenting ER requesting a chest x-ray. Patient denies any chest pain or palpitations. Had mild nausea which resolved. No abdominal pain no vomiting or diarrhea. No history of ACS or DVT or PE      EKG: NSR. Rate 94. Normal intervals, normal axis, no ST-elevations or depressions. No signs of ischemia. Interpreted by Mickie Constantino MD               Past Medical History:   Diagnosis Date    Psoriasis     Psoriatic arthritis ([facility])     SVT (supraventricular tachycardia) ([facility])        History reviewed. No pertinent surgical history.       Family History:   Problem Relation Age of Onset    Hypertension Mother     Arrhythmia Sister         SVT    Heart Disease Maternal Uncle        Social History     Socioeconomic History    Marital status:      Spouse name: Not on file    Number of children: Not on file    Years of education: Not on file    Highest education level: Not on file   Occupational History    Not on file   Social Needs    Financial resource strain: Not on file    Food insecurity     Worry: Not on file     Inability: Not on file    Transportation needs     Medical: Not on file     Non-medical: Not on file   Tobacco Use    Smoking status: Never Smoker    Smokeless tobacco: Never Used   Substance and Sexual Activity    Alcohol use: Yes     Comment: occasionally    Drug use: Not on file    Sexual activity: Not on file   Lifestyle    Physical activity     Days per week: Not on file     Minutes per session: Not on file    Stress: Not on file   Relationships    Social connections     Talks on phone: Not on file     Gets together: Not on file     Attends Congregational service: Not on file     Active member of club or organization: Not on file     Attends meetings of clubs or organizations: Not on file     Relationship status: Not on file    Intimate partner violence     Fear of current or ex partner: Not on file     Emotionally abused: Not on file     Physically abused: Not on file     Forced sexual activity: Not on file   Other Topics Concern    Not on file   Social History Narrative    ** Merged History Encounter **              ALLERGIES: Harriet Jaylan [tofacitinib]; Aspirin; Aspirin; Azithromycin; Azithromycin; Contrast agent [iodine]; Humira [adalimumab]; Pcn [penicillins]; Penicillins; Remicade [infliximab]; Sulfa (sulfonamide antibiotics); and Sulfa (sulfonamide antibiotics)    Review of Systems   Constitutional: Negative for chills and fever. HENT: Positive for congestion. Negative for sore throat. Eyes: Negative for pain. Respiratory: Positive for cough and shortness of breath. Cardiovascular: Negative for chest pain. Gastrointestinal: Negative for abdominal pain, diarrhea, nausea and vomiting. Genitourinary: Negative for dysuria and flank pain. Musculoskeletal: Negative for back pain and neck pain. Skin: Negative for rash. Neurological: Negative for dizziness and headaches. Vitals:    11/05/20 1848   BP: 126/75   Pulse: 83   Resp: 16   Temp: 98.1 °F (36.7 °C)   SpO2: 98%   Weight: 70.2 kg (154 lb 12.2 oz)   Height: 5' 2\" (1.575 m)            Physical Exam  Constitutional:       Appearance: She is well-developed. HENT:      Head: Normocephalic. Comments: B/l serous TM effusions.  No bulging or drainage  Nasal congestion  Posterior oropharynx erythema, cobblestoning appearance. No edema, no enlarge tonsils or exduate. Eyes:      Conjunctiva/sclera: Conjunctivae normal.   Neck:      Musculoskeletal: Normal range of motion and neck supple. Cardiovascular:      Rate and Rhythm: Normal rate and regular rhythm. Pulmonary:      Effort: Pulmonary effort is normal. No respiratory distress. Breath sounds: Normal breath sounds. Abdominal:      General: Bowel sounds are normal.      Palpations: Abdomen is soft. Tenderness: There is no abdominal tenderness. Musculoskeletal: Normal range of motion. Skin:     General: Skin is warm. Capillary Refill: Capillary refill takes less than 2 seconds. Findings: No rash. Neurological:      Mental Status: She is alert and oriented to person, place, and time. Comments: No gross motor or sensory deficits          MDM  Number of Diagnoses or Management Options  Acute bronchitis, unspecified organism:   Post-nasal drip:   SOB (shortness of breath):   Viral URI with cough:   Diagnosis management comments: Patient presenting ER with report of cough and shortness of breath. Patient also reporting a raspy voice. On exam patient has serous ear effusions and evidence of postnasal drip. Lungs are clear to auscultation and patient satting well on room air. Chest x-ray unremarkable and EKG normal.  Already had COVID-19 test performed 2 days ago which was negative. I discussed diagnosis of bronchitis. In light of patient having lupus and continued symptoms concern for complicated bronchitis initially ordered azithromycin however discovered the patient is allergic so changed antibiotic to doxycycline for atypical coverage. Started patient on decongestants and Flonase as well as albuterol inhaler. Advised to follow-up with her primary care provider.        Amount and/or Complexity of Data Reviewed  Tests in the radiology section of CPT®: reviewed           Procedures        Recent Results (from the past 24 hour(s))   EKG, 12 LEAD, INITIAL    Collection Time: 11/05/20  7:57 PM   Result Value Ref Range    Ventricular Rate 94 BPM    Atrial Rate 94 BPM    P-R Interval 132 ms    QRS Duration 74 ms    Q-T Interval 364 ms    QTC Calculation (Bezet) 455 ms    Calculated P Axis 67 degrees    Calculated R Axis 42 degrees    Calculated T Axis 29 degrees    Diagnosis       Normal sinus rhythm  Normal ECG  When compared with ECG of 08-JAN-2020 11:08,  No significant change was found         Xr Chest Pa Lat    Result Date: 11/5/2020  EXAM:  XR CHEST PA LAT INDICATION:   cough COMPARISON: Chest radiograph 1/8/2020. FINDINGS: PA and lateral radiographs of the chest were obtained. No evidence of focal consolidation. No pleural effusion or pneumothorax. Heart, lurdes, mediastinum are within normal limits. No acute osseous abnormalities. IMPRESSION: No acute cardiopulmonary process.

## 2020-11-08 LAB
ATRIAL RATE: 94 BPM
CALCULATED P AXIS, ECG09: 67 DEGREES
CALCULATED R AXIS, ECG10: 42 DEGREES
CALCULATED T AXIS, ECG11: 29 DEGREES
DIAGNOSIS, 93000: NORMAL
P-R INTERVAL, ECG05: 132 MS
Q-T INTERVAL, ECG07: 364 MS
QRS DURATION, ECG06: 74 MS
QTC CALCULATION (BEZET), ECG08: 455 MS
VENTRICULAR RATE, ECG03: 94 BPM

## 2021-01-29 ENCOUNTER — OFFICE VISIT (OUTPATIENT)
Dept: CARDIOLOGY CLINIC | Age: 41
End: 2021-01-29
Payer: COMMERCIAL

## 2021-01-29 VITALS
HEIGHT: 62 IN | SYSTOLIC BLOOD PRESSURE: 100 MMHG | DIASTOLIC BLOOD PRESSURE: 68 MMHG | HEART RATE: 93 BPM | BODY MASS INDEX: 28.52 KG/M2 | WEIGHT: 155 LBS

## 2021-01-29 DIAGNOSIS — I47.1 SVT (SUPRAVENTRICULAR TACHYCARDIA) (HCC): Primary | ICD-10-CM

## 2021-01-29 DIAGNOSIS — R07.9 CHEST PAIN, UNSPECIFIED TYPE: ICD-10-CM

## 2021-01-29 PROCEDURE — 99214 OFFICE O/P EST MOD 30 MIN: CPT | Performed by: SPECIALIST

## 2021-01-29 RX ORDER — METOPROLOL SUCCINATE 50 MG/1
50 TABLET, EXTENDED RELEASE ORAL DAILY
Qty: 90 TAB | Refills: 3 | Status: SHIPPED | OUTPATIENT
Start: 2021-01-29 | End: 2021-04-16 | Stop reason: SDUPTHER

## 2021-01-29 NOTE — PROGRESS NOTES
Chief Complaint   Patient presents with   [facility] Follow Up     [facility] 11/5/20 SOB, URI    SVT    Chest Pain (Angina)     Visit Vitals  /68 (BP 1 Location: Left upper arm, BP Patient Position: Sitting)   Pulse 93   Ht 5' 2\" (1.575 m)   Wt 155 lb (70.3 kg)   BMI 28.35 kg/m²     [facility] within past 6 months. From job sent her d/t tachy, lightheaded, nausea, couldn't respond to questions from paramedic. Lived in [place], cardiologist had her on meds to help with HR.  In [facility] ryley Paul  Ph: [phone]  Fax: [phone]    Happens 1x monthly, sitting at desk, all of a sudden flutters, shooting pain lasts 20-30 min. Goes away. Fit bit HR goes up to 150's. Resting HR on a normal day 108-110. Hardly ever goes under 90.     Rheumatologist: [employer] in [place], [address] [facility]

## 2021-01-29 NOTE — PROGRESS NOTES
Dipika Duncan MD. [facility]              Patient: Dal Apley  : 1980      Today's Date: 2021          HISTORY OF PRESENT ILLNESS:     History of Present Illness:    She has had some episodes of heart racing suddenly - HR can go to 150 and then gets lightheaded and diaphoretic. Went to [facility] and [facility] past 6 months. Lasts 20-30 min. Was back to [facility] by the time in hospital.        [place] [facility], [facility] within past 6 months. From job sent her d/t tachy, lightheaded, nausea, couldn't respond to questions from paramedic.     Happens 1x monthly, sitting at desk, all of a sudden flutters, shooting pain lasts 20-30 min. Goes away. Fit bit HR goes up to 150's. Resting HR on a normal day 108-110. Hardly ever goes under 90. Otherwise is OK. PAST MEDICAL HISTORY:     Past Medical History:   Diagnosis Date    Psoriasis     Psoriatic arthritis ([address]     SV[facility]) ([facility])              MEDICATIONS:     Current Outpatient Medications   Medication Sig Dispense Refill    etanercept (ENBREL SC) 1 Dose by SubCUTAneous route every seven (7) days. On       fluticasone propionate (Flonase Allergy Relief) 50 mcg/actuation nasal spray 2 Sprays by Both Nostrils route daily. Indications: nasal congestion 1 Bottle 0    albuterol (PROVENTIL HFA, VENTOLIN HFA, PROAIR HFA) 90 mcg/actuation inhaler Take 2 Puffs by inhalation every six (6) hours as needed for Wheezing.  1 Inhaler 0    LO LOESTRIN FE 1 mg-10 mcg (24)/10 mcg (2) tab TK 1 T PO D         Allergies   Allergen Reactions    Xeljanz [Tofacitinib] Anaphylaxis    Aspirin Unknown (comments)    Aspirin Hives    Azithromycin Unknown (comments)    Azithromycin Hives    Contrast Agent [Iodine] Unknown (comments)    Humira [Adalimumab] Hives    Novocain [Procaine] Hives    Pcn [Penicillins] Unknown (comments)    Penicillins Hives    Remicade [Infliximab] Hives    Sulfa (Sulfonamide Antibiotics) Unknown (comments)    Sulfa (Sulfonamide Antibiotics) Hives             SOCIAL HISTORY:     Social History     Tobacco Use    Smoking status: Never Smoker    Smokeless tobacco: Never Used   Substance Use Topics    Alcohol use: Yes     Comment: occasionally    Drug use: Not on file         FAMILY HISTORY:     Family History   Problem Relation Age of Onset    Hypertension Mother     Arrhythmia Sister         SVT    Heart Disease Maternal Uncle                REVIEW OF SYMPTOMS:      Review of Symptoms:  Constitutional: Negative for fever, chills  HEENT: Negative for nosebleeds, tinnitus, and vision changes. Respiratory: Negative for cough, wheezing  Cardiovascular: Negative for orthopnea, claudication, syncope, and PND. Gastrointestinal: Negative for abdominal pain, diarrhea, melena. Genitourinary: Negative for dysuria  Musculoskeletal: Negative for myalgias. + joint pain. Skin: Negative for rash  Heme: No problems bleeding. Bruises easily. Neurological: Negative for speech change and focal weakness.            PHYSICAL EXAM:      Physical Exam:  Visit Vitals  /68 (BP 1 Location: Left upper arm, BP Patient Position: Sitting)   Pulse 93   Ht 5' 2\" (1.575 m)   Wt 155 lb (70.3 kg)   BMI 28.35 kg/m²       Patient appears generally well, mood and affect are appropriate and pleasant. HEENT:  Hearing intact, non-icteric, normocephalic, atraumatic. Neck Exam: Supple, No JVD or carotid bruits. Lung Exam: Clear to auscultation, even breath sounds. Cardiac Exam: Regular rate and rhythm with no murmur or rub  Abdomen: Soft, non-tender, normal bowel sounds. No bruits or masses. Extremities: Moves all ext well. No lower extremity edema. Vascular: 2+ dorsalis pedis pulses bilaterally.   Psych: Appropriate affect  Neuro - Grossly intact           LABS / OTHER STUDIES:           Labs 1/8/20 - [facility] - D-dimer and trop normal, CBC and CMP OK         CARDIAC DIAGNOSTICS:      Cardiac Evaluation Includes:     EKG 1/8/20 - sinus tach, normal    EKG 1/9/20 - NSR, normal   EKG 11/5/20 - NSR, normal      Abd U/S 1/8/20 - Unremarkable right upper quadrant ultrasound. CXR 1/8/20 - No acute process.     Echo July 2014 - LVEF 67%, mild MVP of anterior leaflet, trace MR  Echo May 2016 - LVEF 55-60%, mild MVP anterior leaflet, no MR  Stress test 6/16 - walked 9 min, normal ETT study   Event monitor June 2016 - sinus, 4 and 10 beat run of SVT   Holter 4/27/17 - NSR, brief narrow complex tachycardia, likely sinus tach, cannot rule out atrial tachycardia   Echo 2/21/18 - LVEF 60%, MV leaflets appear normal with trace MR             ASSESSMENT AND PLAN:      Assessment and Plan:  1) History of SVT / palpitations   - She had been on metoprolol in [place], but this was stopped in [place]   - She prefers medical management instead of ablation   - On 1/29/21 she notes a couple of ED visits for heart racing spells which corrected itself before she could be seen in the ED ---->  Will check a 30 day month monitor. Start Toprol XL 50 mg daily. Asked her to stay hydrated. 2) Atypical CP  - she notes a history of non-cardiac type chest pain (normal workup in past)   - has electrical shooting type of random chest pain   - pain sounds non-cardiac in nature ---> check an echo and a treadmill stress test      3) See me in 6 weeks. .  Patient expressed understanding of the plan - questions were answered. Used to live in [place]. 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          Yesika Curry MD, [address], [facility]     [address].  [address], [address]  Ph: [phone]                               Ph [phone]          ADDENDUM   3/4/2021  Event Monitor 2/1/21-3/2/21 - NSR, HR , Avg 85 bpm.  Chest pain, skipped beats symptoms associated with sinus. < 1% PAC/PVC's. Will call     ADDENDUM   3/5/2021  I called and left a VM.

## 2021-02-22 ENCOUNTER — TELEPHONE (OUTPATIENT)
Dept: CARDIOLOGY CLINIC | Age: 41
End: 2021-02-22

## 2021-02-22 NOTE — TELEPHONE ENCOUNTER
Patient states she was prescribed Metoprolol 50 mg but states she feels dizzy with it all the time and would like to know if dosage can be decreased. Please advise.      Phone: [phone]

## 2021-02-22 NOTE — TELEPHONE ENCOUNTER
Spoke to pt,  [facility] says pulse has been in 60-70's. C/o dizzy, migraines. Didn't take BP. Dr. Heaven Del Rosario,  She stated that she was previously on Toprol XL 25 mg daily and was asking if it is ok to go back to that. Asked pt to keep track of BP and let us know how she's feeling.

## 2021-04-16 ENCOUNTER — ANCILLARY PROCEDURE (OUTPATIENT)
Dept: CARDIOLOGY CLINIC | Age: 41
End: 2021-04-16
Payer: COMMERCIAL

## 2021-04-16 ENCOUNTER — ANCILLARY PROCEDURE (OUTPATIENT)
Dept: CARDIOLOGY CLINIC | Age: 41
End: 2021-04-16

## 2021-04-16 ENCOUNTER — OFFICE VISIT (OUTPATIENT)
Dept: CARDIOLOGY CLINIC | Age: 41
End: 2021-04-16

## 2021-04-16 VITALS
BODY MASS INDEX: 28.34 KG/M2 | WEIGHT: 154 LBS | SYSTOLIC BLOOD PRESSURE: 110 MMHG | DIASTOLIC BLOOD PRESSURE: 64 MMHG | HEIGHT: 62 IN

## 2021-04-16 VITALS
OXYGEN SATURATION: 98 % | WEIGHT: 154 LBS | DIASTOLIC BLOOD PRESSURE: 64 MMHG | HEIGHT: 62 IN | SYSTOLIC BLOOD PRESSURE: 110 MMHG | BODY MASS INDEX: 28.34 KG/M2 | HEART RATE: 81 BPM

## 2021-04-16 VITALS
BODY MASS INDEX: 28.34 KG/M2 | HEIGHT: 62 IN | SYSTOLIC BLOOD PRESSURE: 110 MMHG | DIASTOLIC BLOOD PRESSURE: 64 MMHG | WEIGHT: 154 LBS

## 2021-04-16 DIAGNOSIS — I47.1 SVT (SUPRAVENTRICULAR TACHYCARDIA) (HCC): ICD-10-CM

## 2021-04-16 DIAGNOSIS — I47.1 SVT (SUPRAVENTRICULAR TACHYCARDIA) (HCC): Primary | ICD-10-CM

## 2021-04-16 DIAGNOSIS — R07.9 CHEST PAIN, UNSPECIFIED TYPE: ICD-10-CM

## 2021-04-16 LAB
ECHO AO ROOT DIAM: 2.62 CM
ECHO AV AREA PEAK VELOCITY: 2.7 CM2
ECHO AV AREA VTI: 2.74 CM2
ECHO AV AREA/BSA PEAK VELOCITY: 1.6 CM2/M2
ECHO AV AREA/BSA VTI: 1.6 CM2/M2
ECHO AV MEAN GRADIENT: 3.53 MMHG
ECHO AV PEAK GRADIENT: 7.06 MMHG
ECHO AV PEAK VELOCITY: 132.83 CM/S
ECHO AV VTI: 21.98 CM
ECHO EST RA PRESSURE: 3 MMHG
ECHO LA AREA 4C: 11.82 CM2
ECHO LA MAJOR AXIS: 2.94 CM
ECHO LA MINOR AXIS: 1.72 CM
ECHO LA VOL 2C: 38.4 ML (ref 22–52)
ECHO LA VOL 4C: 27.33 ML (ref 22–52)
ECHO LA VOL BP: 36.24 ML (ref 22–52)
ECHO LA VOL/BSA BIPLANE: 21.19 ML/M2 (ref 16–28)
ECHO LA VOLUME INDEX A2C: 22.45 ML/M2 (ref 16–28)
ECHO LA VOLUME INDEX A4C: 15.98 ML/M2 (ref 16–28)
ECHO LV E' LATERAL VELOCITY: 11.52 CM/S
ECHO LV E' SEPTAL VELOCITY: 9.33 CM/S
ECHO LV EDV A2C: 75.25 ML
ECHO LV EDV A4C: 74.07 ML
ECHO LV EDV BP: 74.85 ML (ref 56–104)
ECHO LV EDV INDEX A4C: 43.3 ML/M2
ECHO LV EDV INDEX BP: 43.8 ML/M2
ECHO LV EDV NDEX A2C: 44 ML/M2
ECHO LV EJECTION FRACTION A2C: 58 PERCENT
ECHO LV EJECTION FRACTION A4C: 61 PERCENT
ECHO LV EJECTION FRACTION BIPLANE: 58.7 PERCENT (ref 55–100)
ECHO LV ESV A2C: 31.28 ML
ECHO LV ESV A4C: 28.84 ML
ECHO LV ESV BP: 30.9 ML (ref 19–49)
ECHO LV ESV INDEX A2C: 18.3 ML/M2
ECHO LV ESV INDEX A4C: 16.9 ML/M2
ECHO LV ESV INDEX BP: 18.1 ML/M2
ECHO LV GLOBAL LONGITUDINAL STRAIN (GLS): -20.8 PERCENT
ECHO LV INTERNAL DIMENSION DIASTOLIC: 4.34 CM (ref 3.9–5.3)
ECHO LV INTERNAL DIMENSION SYSTOLIC: 3 CM
ECHO LV IVSD: 0.68 CM (ref 0.6–0.9)
ECHO LV MASS 2D: 85.1 G (ref 67–162)
ECHO LV MASS INDEX 2D: 49.7 G/M2 (ref 43–95)
ECHO LV POSTERIOR WALL DIASTOLIC: 0.66 CM (ref 0.6–0.9)
ECHO LVOT DIAM: 2.09 CM
ECHO LVOT PEAK GRADIENT: 4.38 MMHG
ECHO LVOT PEAK VELOCITY: 104.68 CM/S
ECHO LVOT SV: 60.1 ML
ECHO LVOT VTI: 17.56 CM
ECHO MV A VELOCITY: 52.85 CM/S
ECHO MV E DECELERATION TIME (DT): 184.29 MS
ECHO MV E VELOCITY: 73.53 CM/S
ECHO MV E/A RATIO: 1.39
ECHO MV E/E' LATERAL: 6.38
ECHO MV E/E' RATIO (AVERAGED): 7.13
ECHO MV E/E' SEPTAL: 7.88
ECHO MV MAX VELOCITY: 69.34 CM/S
ECHO MV MEAN GRADIENT: 0.98 MMHG
ECHO MV PEAK GRADIENT: 1.92 MMHG
ECHO MV PRESSURE HALF TIME (PHT): 53.44 MS
ECHO MV VTI: 15.46 CM
ECHO RA AREA 4C: 10.4 CM2
ECHO RIGHT VENTRICULAR SYSTOLIC PRESSURE (RVSP): 24.51 MMHG
ECHO RV INTERNAL DIMENSION: 3.72 CM
ECHO RV TAPSE: 1.72 CM (ref 1.5–2)
ECHO TV REGURGITANT MAX VELOCITY: 231.88 CM/S
ECHO TV REGURGITANT PEAK GRADIENT: 21.51 MMHG
GLOBAL LONGITUDINAL STRAIN 2 CHAMBER: -20.4 PERCENT
GLOBAL LONGITUDINAL STRAIN 4 CHAMBER: -24.3 PERCENT
GLOBAL LONGITUDINAL STRAIN LONG AXIS: -17.8 PERCENT
STRESS ANGINA INDEX: 0
STRESS BASELINE DIAS BP: 64 MMHG
STRESS BASELINE HR: 93 BPM
STRESS BASELINE SYS BP: 110 MMHG
STRESS ESTIMATED WORKLOAD: 10.1 METS
STRESS EXERCISE DUR MIN: NORMAL
STRESS PEAK DIAS BP: 80 MMHG
STRESS PEAK SYS BP: 132 MMHG
STRESS PERCENT HR ACHIEVED: 101 %
STRESS POST PEAK HR: 184 BPM
STRESS RATE PRESSURE PRODUCT: NORMAL BPM*MMHG
STRESS ST DEPRESSION: 0 MM
STRESS ST ELEVATION: 0 MM
STRESS TARGET HR: 182 BPM

## 2021-04-16 PROCEDURE — 99214 OFFICE O/P EST MOD 30 MIN: CPT | Performed by: SPECIALIST

## 2021-04-16 PROCEDURE — 93015 CV STRESS TEST SUPVJ I&R: CPT | Performed by: SPECIALIST

## 2021-04-16 PROCEDURE — 93306 TTE W/DOPPLER COMPLETE: CPT | Performed by: SPECIALIST

## 2021-04-16 RX ORDER — GUSELKUMAB 100 MG/ML
INJECTION SUBCUTANEOUS
COMMUNITY

## 2021-04-16 RX ORDER — METOPROLOL SUCCINATE 50 MG/1
50 TABLET, EXTENDED RELEASE ORAL DAILY
Qty: 90 TAB | Refills: 3 | Status: SHIPPED | OUTPATIENT
Start: 2021-04-16 | End: 2022-05-23

## 2021-04-16 RX ORDER — BISMUTH SUBSALICYLATE 262 MG
1 TABLET,CHEWABLE ORAL DAILY
COMMUNITY

## 2021-04-16 NOTE — PROGRESS NOTES
Room 6    Visit Vitals  /64   Pulse 81   Ht 5' 2\" (1.575 m)   Wt 154 lb (69.9 kg)   SpO2 98%   BMI 28.17 kg/m²       Echo  Stress test  Review and Discuss    Exercise, 3-4 min on cardio machine, get out breath, nausea and dizzy     Chest pain: no  Shortness of breath: no  Edema: no  Palpitations: no  Dizziness: no    New diagnosis/Surgeries: no    ER/Hospitalizations: no    Refills: no

## 2021-04-16 NOTE — PROGRESS NOTES
Mikaela Purcell MD. [facility] [place]              Patient: Eduin Brooks  : 1980      Today's Date: 2021          HISTORY OF PRESENT ILLNESS:     History of Present Illness:  Here for follow-up. She is feeling better on Toprol XL 25 mg daily -- HR is better. She feels HR races when she starts working out and can only work out 3 min. No heart racing spells at rest.            PAST MEDICAL HISTORY:     Past Medical History:   Diagnosis Date    Psoriasis     Psoriatic arthritis ([address]     SVT (supraventricular tachycardia) ([facility])            MEDICATIONS:     Current Outpatient Medications   Medication Sig Dispense Refill    guselkumab (Tremfya) 100 mg/mL atIn by SubCUTAneous route. Inject once every 8 weeks for psoriasis and arthritis      multivitamin (ONE A DAY) tablet Take 1 Tab by mouth daily.  Lactobac no.41/Bifidobact no.7 (PROBIOTIC-10 PO) Take  by mouth.  metoprolol succinate (TOPROL-XL) 50 mg XL tablet Take 1 Tab by mouth daily. (Patient taking differently: Take 25 mg by mouth daily.) 90 Tab 3    fluticasone propionate (Flonase Allergy Relief) 50 mcg/actuation nasal spray 2 Sprays by Both Nostrils route daily. Indications: nasal congestion 1 Bottle 0    albuterol (PROVENTIL HFA, VENTOLIN HFA, PROAIR HFA) 90 mcg/actuation inhaler Take 2 Puffs by inhalation every six (6) hours as needed for Wheezing. 1 Inhaler 0    LO LOESTRIN FE 1 mg-10 mcg (24)/10 mcg (2) tab TK 1 T PO D      etanercept (ENBREL SC) 1 Dose by SubCUTAneous route every seven (7) days.  On          Allergies   Allergen Reactions    Rach Bach [Tofacitinib] Anaphylaxis    Aspirin Unknown (comments)    Aspirin Hives    Azithromycin Unknown (comments)    Azithromycin Hives    Contrast Agent [Iodine] Unknown (comments)    Humira [Adalimumab] Hives    Novocain [Procaine] Hives    Pcn [Penicillins] Unknown (comments)    Penicillins Hives    Remicade [Infliximab] Hives    Sulfa (Sulfonamide Antibiotics) Unknown (comments)    Sulfa (Sulfonamide Antibiotics) Hives           SOCIAL HISTORY:     Social History     Tobacco Use    Smoking status: Never Smoker    Smokeless tobacco: Never Used   Substance Use Topics    Alcohol use: Not Currently    Drug use: Never         FAMILY HISTORY:     Family History   Problem Relation Age of Onset    Hypertension Mother     Arrhythmia Sister         SVT    Heart Disease Maternal Uncle               REVIEW OF SYMPTOMS:      Review of Symptoms:  Constitutional: Negative for fever, chills  HEENT: Negative for nosebleeds, tinnitus, and vision changes. Respiratory: Negative for cough, wheezing  Cardiovascular: Negative for orthopnea, claudication, syncope, and PND. Gastrointestinal: Negative for abdominal pain, diarrhea, melena. Genitourinary: Negative for dysuria  Musculoskeletal: Negative for myalgias.  + joint pain.     Skin: Negative for rash  Heme: No problems bleeding.  Bruises easily.    Neurological: Negative for speech change and focal weakness.            PHYSICAL EXAM:      Physical Exam:  Visit Vitals  /64   Pulse 81   Ht 5' 2\" (1.575 m)   Wt 154 lb (69.9 kg)   SpO2 98%   BMI 28.17 kg/m²          Patient appears generally well, mood and affect are appropriate and pleasant. HEENT:  Hearing intact, non-icteric, normocephalic, atraumatic. Neck Exam: Supple, No JVD   Lung Exam: Clear to auscultation, even breath sounds. Cardiac Exam: Regular rate and rhythm with no murmur or rub  Abdomen: Soft, non-tender  Extremities: Moves all ext well. No lower extremity edema. Psych: Appropriate affect  Neuro - Grossly intact           LABS / OTHER STUDIES:            Labs 1/8/20 - [facility] - D-dimer and trop normal, CBC and CMP OK         CARDIAC DIAGNOSTICS:      Cardiac Evaluation Includes:     EKG 1/8/20 - sinus tach, normal    EKG 1/9/20 - NSR, normal   EKG 11/5/20 - NSR, normal      Abd U/S 1/8/20 - Unremarkable right upper quadrant ultrasound.   CXR 1/8/20 - No acute process.     Echo July 2014 - LVEF 67%, mild MVP of anterior leaflet, trace MR  Echo May 2016 - LVEF 55-60%, mild MVP anterior leaflet, no MR  Stress test 6/16 - walked 9 min, normal ETT study   Event monitor June 2016 - sinus, 4 and 10 beat run of SVT   Holter 4/27/17 - NSR, brief narrow complex tachycardia, likely sinus tach, cannot rule out atrial tachycardia   Echo 2/21/18 - LVEF 60%, MV leaflets appear normal with trace MR         Event Monitor 2/1/21-3/2/21 - NSR, HR , Avg 85 bpm.  Chest pain, skipped beats symptoms associated with sinus. < 1% PAC/PVC's. Treadmill stress test 4/16/21- walked 7 min (10 METS), normal study     Echo 4/16/21 - LVEF 59%, normal strain         ASSESSMENT AND PLAN:      Assessment and Plan:  1) History of SVT / palpitations   - She had been on metoprolol in [place], but this was stopped in [place]   - She prefers medical management instead of ablation   - On 1/29/21 she notes a couple of ED visits for heart racing spells which corrected itself before she could be seen in the ED ---->  Will check a 30 day month monitor. Start Toprol XL 50 mg daily. Asked her to stay hydrated. - On 4/15/21 - She is feeling better on Toprol XL 25. No more resting HR racing spells. Discussed [facility] as an option to capture any future SVT.    Cont BB (can try 50 mg) and asked her to stay hydrated before exercising.      2) Atypical CP  - she notes a history of non-cardiac type chest pain (normal workup in past)   - has electrical shooting type of random chest pain   - pain sounds non-cardiac in nature   - Echo and stress test were normal 4/16/21      3) See me in 12 months.  Patient expressed understanding of the plan - questions were answered.      Used to live in [facility]  as pre-. Chayito Robertson 19 yo.          Rosario Howard MD, [address] [place], [address] [id number]  [address]  [place], [address] [place] [address]  Ph: [phone]                                [phone]

## 2022-05-23 RX ORDER — METOPROLOL SUCCINATE 50 MG/1
TABLET, EXTENDED RELEASE ORAL
Qty: 90 TABLET | Refills: 1 | Status: SHIPPED | OUTPATIENT
Start: 2022-05-23 | End: 2022-09-27

## 2022-05-23 NOTE — TELEPHONE ENCOUNTER
Refill per VO of Dr. Jackie Valente  Last appt: 4/16/2021  Future Appointments   Date Time Provider Molly Mari   6/16/2022  2:00 PM Elizabeth Yanez NP [facility]       Requested Prescriptions     Signed Prescriptions Disp Refills    metoprolol succinate (TOPROL-XL) 50 mg XL tablet 90 Tablet 1     Sig: TAKE 1 TABLET BY MOUTH DAILY     Authorizing Provider: Caden Harrison     Ordering User: Kerry Oliveros

## 2022-07-23 ENCOUNTER — APPOINTMENT (OUTPATIENT)
Dept: CT IMAGING | Age: 42
End: 2022-07-23
Attending: NURSE PRACTITIONER
Payer: COMMERCIAL

## 2022-07-23 ENCOUNTER — HOSPITAL ENCOUNTER (EMERGENCY)
Age: 42
Discharge: HOME | End: 2022-07-23
Attending: EMERGENCY MEDICINE
Payer: COMMERCIAL

## 2022-07-23 VITALS
RESPIRATION RATE: 16 BRPM | HEIGHT: 62 IN | WEIGHT: 160 LBS | OXYGEN SATURATION: 97 % | TEMPERATURE: 98.4 F | SYSTOLIC BLOOD PRESSURE: 128 MMHG | BODY MASS INDEX: 29.44 KG/M2 | HEART RATE: 80 BPM | DIASTOLIC BLOOD PRESSURE: 79 MMHG

## 2022-07-23 DIAGNOSIS — S09.8XXA BLUNT HEAD TRAUMA, INITIAL ENCOUNTER: Primary | ICD-10-CM

## 2022-07-23 DIAGNOSIS — S89.91XA INJURY OF RIGHT KNEE, INITIAL ENCOUNTER: ICD-10-CM

## 2022-07-23 DIAGNOSIS — S01.81XA LACERATION OF FOREHEAD, INITIAL ENCOUNTER: ICD-10-CM

## 2022-07-23 PROCEDURE — 90471 IMMUNIZATION ADMIN: CPT

## 2022-07-23 PROCEDURE — 74011250636 HC RX REV CODE- 250/636: Performed by: NURSE PRACTITIONER

## 2022-07-23 PROCEDURE — 70450 CT HEAD/BRAIN W/O DYE: CPT

## 2022-07-23 PROCEDURE — 75810000293 HC SIMP/SUPERF WND  RPR

## 2022-07-23 PROCEDURE — 90715 TDAP VACCINE 7 YRS/> IM: CPT | Performed by: NURSE PRACTITIONER

## 2022-07-23 PROCEDURE — 99284 EMERGENCY DEPT VISIT MOD MDM: CPT

## 2022-07-23 PROCEDURE — 74011000250 HC RX REV CODE- 250: Performed by: NURSE PRACTITIONER

## 2022-07-23 RX ORDER — BACITRACIN 500 UNIT/G
1 PACKET (EA) TOPICAL
Status: COMPLETED | OUTPATIENT
Start: 2022-07-23 | End: 2022-07-23

## 2022-07-23 RX ORDER — LIDOCAINE HYDROCHLORIDE AND EPINEPHRINE 10; 10 MG/ML; UG/ML
1.5 INJECTION, SOLUTION INFILTRATION; PERINEURAL
Status: COMPLETED | OUTPATIENT
Start: 2022-07-23 | End: 2022-07-23

## 2022-07-23 RX ADMIN — Medication 1 PACKET: at 19:09

## 2022-07-23 RX ADMIN — LIDOCAINE HYDROCHLORIDE,EPINEPHRINE BITARTRATE 15 MG: 10; .01 INJECTION, SOLUTION INFILTRATION; PERINEURAL at 18:03

## 2022-07-23 RX ADMIN — TETANUS TOXOID, REDUCED DIPHTHERIA TOXOID AND ACELLULAR PERTUSSIS VACCINE, ADSORBED 0.5 ML: 5; 2.5; 8; 8; 2.5 SUSPENSION INTRAMUSCULAR at 20:06

## 2022-07-23 NOTE — ED TRIAGE NOTES
Pt to ED for c/o head pain after tripping on wooden step after striking knee causing pt to fall and hit head on metal. Denies LOC. Reports nausea and headache at this time    Pt took 800mg motrin 2 hrs PTA.  Pt currently not on any blood thinners

## 2022-07-23 NOTE — Clinical Note
[facility]  [facility] EMERGENCY DEPT  [address] [id number]  [phone]    Work/School Note    Date: 7/23/2022    To Whom It May concern:    Jailyn Antoine was seen and treated today in the emergency room by the following provider(s):  Attending Provider: Olinda Encarnacion MD  Nurse Practitioner: Fortunato Cohen, Jim Allred NP. Jailyn Antoine is excused from work/school on 7/23/2022 through 7/25/2022. She is medically clear to return to work/school on 7/26/2022.          Sincerely,          Nicole Rodríguez NP

## 2022-07-23 NOTE — ED PROVIDER NOTES
44-year-old female with past medical history of psoriasis, psoriatic arthritis, Lupus and SVT presents via wheelchair with family member after sustaining a ground-level fall. Patient was wearing slide on flip-flops and tripped on a wooden step hitting her right knee and she fell forward striking the right side of her forehead on the metal door ledge. Bleeding controlled, patient denies blood thinner use. Patient denies loss of consciousness, states that she was nauseated and had a headache [facility] took 800 mg Motrin 2 hours prior to arrival and is now denying headache. She denies feeling dizzy or lightheaded, denies vision change, denies fever, chills, chest pain, shortness of breath. Unknown last tetatnus vaccine. Past Medical History:   Diagnosis Date    Psoriasis     Psoriatic arthritis ([facility] [address]     SVT (supraventricular tachycardia) ([facility]        No past surgical history on file.       Family History:   Problem Relation Age of Onset    Hypertension Mother     Arrhythmia Sister         SVT    Heart Disease Maternal Uncle        Social History     Socioeconomic History    Marital status:      Spouse name: Not on file    Number of children: Not on file    Years of education: Not on file    Highest education level: Not on file   Occupational History    Not on file   Tobacco Use    Smoking status: Never    Smokeless tobacco: Never   Substance and Sexual Activity    Alcohol use: Not Currently    Drug use: Never    Sexual activity: Not on file   Other Topics Concern    Not on file   Social History Narrative    ** Merged History Encounter **          Social Determinants of Health     Financial Resource Strain: Not on file   Food Insecurity: Not on file   Transportation Needs: Not on file   Physical Activity: Not on file   Stress: Not on file   Social Connections: Not on file   Intimate Partner Violence: Not on file   Housing Stability: Not on file         ALLERGIES: Lex Romero [tofacitinib], Aspirin, Aspirin, Azithromycin, Azithromycin, Contrast agent [iodine], Humira [adalimumab], Novocain [procaine], Pcn [penicillins], Penicillins, Remicade [infliximab], Sulfa (sulfonamide antibiotics), and Sulfa (sulfonamide antibiotics)    Review of Systems   Constitutional:  Negative for chills and fever. HENT:  Negative for congestion and sore throat. Eyes: Negative. Negative for visual disturbance. Respiratory:  Negative for cough and shortness of breath. Cardiovascular:  Negative for chest pain. Gastrointestinal:  Negative for abdominal pain, diarrhea and nausea. Endocrine: Negative. Genitourinary:  Negative for difficulty urinating. Musculoskeletal:  Negative for back pain and neck pain. Skin:  Positive for wound. Negative for rash. Laceration to the right forehead. Small abrasion to the right knee. Allergic/Immunologic: Negative. Neurological:  Negative for dizziness, weakness and light-headedness. Psychiatric/Behavioral:  Negative for confusion. Vitals:    07/23/22 1729   BP: 131/89   Pulse: 88   Resp: 16   Temp: 98.4 °F (36.9 °C)   SpO2: 97%   Weight: 72.6 kg (160 lb)   Height: 5' 2\" (1.575 m)            Physical Exam  Vitals and nursing note reviewed. Constitutional:       General: She is not in acute distress. Appearance: Normal appearance. She is normal weight. HENT:      Head: Normocephalic. Right Ear: Tympanic membrane, ear canal and external ear normal.      Left Ear: Tympanic membrane, ear canal and external ear normal.      Nose: Nose normal. No congestion. Mouth/Throat:      Mouth: Mucous membranes are moist.   Eyes:      Extraocular Movements: Extraocular movements intact. Pupils: Pupils are equal, round, and reactive to light. Cardiovascular:      Rate and Rhythm: Normal rate. Pulses: Normal pulses. Heart sounds: Normal heart sounds.    Pulmonary:      Effort: Pulmonary effort is normal. No respiratory distress. Abdominal:      General: There is no distension. Musculoskeletal:         General: Normal range of motion. Cervical back: Normal range of motion and neck supple. No rigidity or tenderness. Skin:     General: Skin is warm and dry. Capillary Refill: Capillary refill takes less than 2 seconds. Findings: Abrasion and laceration present. Comments: Small abrasion to the right knee, no bleeding. No erythema, no drainage. Laceration to the forehead, approx. 3.5 cm/    Neurological:      Mental Status: She is alert and oriented to person, place, and time. GCS: GCS eye subscore is 4. GCS verbal subscore is 5. GCS motor subscore is 6. Cranial Nerves: No cranial nerve deficit or facial asymmetry. Sensory: Sensation is intact. Motor: Motor function is intact. Gait: Gait is intact. Psychiatric:         Mood and Affect: Mood normal.         Thought Content: Thought content normal.        MDM  Number of Diagnoses or Management Options  Blunt head trauma, initial encounter  Injury of right knee, initial encounter  Laceration of forehead, initial encounter  Diagnosis management comments: Differential DX: SDH vs. Foreign body vs. Knee effusion       Amount and/or Complexity of Data Reviewed  Tests in the radiology section of CPT®: ordered and reviewed  Discuss the patient with other providers: yes (Discussed with dr Aric Ramirez)           Wound Repair    Date/Time: 7/23/2022 8:01 PM  Performed by: NPSupervising provider: Dr. Joelle Oden  Preparation: skin prepped with Betadine  Pre-procedure re-eval: Immediately prior to the procedure, the patient was reevaluated and found suitable for the planned procedure and any planned medications. Time out: Immediately prior to the procedure a time out was called to verify the correct patient, procedure, equipment, staff and marking as appropriate. .  Location details: face  Wound length:2.5 cm or less    Anesthesia:  Local Anesthetic: lidocaine 1% with epinephrine  Anesthetic total: 3 mL  Foreign bodies: no foreign bodies  Irrigation solution: saline  Irrigation method: jet lavage  Debridement: moderate  Skin closure: 4-0 nylon  Technique: simple and interrupted  Approximation: close  Dressing: 4x4, antibiotic ointment and pressure dressing (bacitracin, tefla, sterile 4x4 and coban)  Patient tolerance: patient tolerated the procedure well with no immediate complications  My total time at bedside, performing this procedure was 1-15 minutes. VITAL SIGNS:  Patient Vitals for the past 4 hrs:   Temp Pulse Resp BP SpO2   07/23/22 1729 98.4 °F (36.9 °C) 88 16 131/89 97 %         LABS:  No results found for this or any previous visit (from the past 6 hour(s)). IMAGING:  CT HEAD WO CONT   Final Result      1. No acute intracranial abnormality. 2. Right frontal scalp soft tissue injury. Medications During Visit:  Medications   lidocaine-EPINEPHrine (XYLOCAINE) 1 %-1:100,000 injection 15 mg (has no administration in time range)   diph,Pertuss(AC),Tet Vac-PF (BOOSTRIX) suspension 0.5 mL (has no administration in time range)   bacitracin 500 unit/gram packet 1 Packet (has no administration in time range)         DECISION MAKING:  Becky Cushing is a 38 y.o. female who comes in as above. Patient presents via wheelchair with family from home with complaints of tripping while walking up the wooden steps, scraped her right knee slightly and hit the right side of her forehead on the metal door step going into the home. Patient denies loss of consciousness, was able to stand her self up and is ambulatory without difficulty. On assessment patient with full range of knee motion to the right knee, strong palpable 2+ pulse, and neurovascularly intact. Patient wearing slide on flip-flops, dressing in place, pressure to the forehead, he is hemostasis achieved.   Patient is AO x4 no neurological deficits, EOM intact, pupils 4 mm and brisk PERRL. Patient denies headache pain at this time, states that she was nauseated initially but is no longer nauseated. Discussed plan with patient to obtain CT of the head, and perform wound repair to the laceration of the forehead. Patient is agreeable. Tetanus vaccination updated, last unknown tetanus date. During wound repair procedure, I discussed extensive wound care with the patient and patient's spouse. Patient verbalized understanding, will give suture removal kit for patient to take to PCP, advised to return to the ER or follow-up with PCP immediately if she develops fever, rash, increased redness pain abnormal drainage. Verbalized understanding agrees with plan. -Discussed possible interaction with Tetanus and the \"umab\" that she takes for arthritis. Per PHARM D, no interaction, may slightly decrease efficacy for tetanus. But is fine to administer. IMPRESSION:  1. Blunt head trauma, initial encounter    2. Laceration of forehead, initial encounter    3. Injury of right knee, initial encounter        DISPOSITION:  Discharged      Current Discharge Medication List           Follow-up Information       Follow up With Specialties Details Why Contact Info    [facility] EMERGENCY DEPT Emergency Medicine  For suture removal in 5 to 7 days. [address] [place] [address]  [phone]              The patient is asked to follow-up with their primary care provider in the next several days. They are to call tomorrow for an appointment. The patient is asked to return promptly for any increased concerns or worsening of symptoms. They can return to this emergency department or any other emergency department.     Simi Stephens NP  8:03 PM

## 2022-07-23 NOTE — DISCHARGE INSTRUCTIONS
Try to keep your sutures clean and dry for the first 24 hours. After 24 hours you may shower do not scrub, let the soap run over and rinse completely. Apply either Aquaphor or bacitracin up to 3 times a day and keep covered until sutures are removed. After that time you may apply sunblock or small amount of vitamin D to help the scar heal.  If you have any abnormal drainage, fever, chills or sutures start coming apart return to the ER immediately.

## 2022-07-23 NOTE — Clinical Note
Layla Carter  [facility] EMERGENCY DEPT  [address] [id number] [phone]    Work/School Note    Date: 7/23/2022    To Whom It May concern:    Avel Camarillo was seen and treated today in the emergency room by the following provider(s):  Attending Provider: Gilma Garcia MD  Nurse Practitioner: Harvinder Lopez NP. Avel Camarillo is excused from work/school on 7/23/2022 through 7/25/2022. She is medically clear to return to work/school on 7/26/2022.          Sincerely,          Leslye Cox, STU

## 2022-08-12 ENCOUNTER — OFFICE VISIT (OUTPATIENT)
Dept: CARDIOLOGY CLINIC | Age: 42
End: 2022-08-12
Payer: COMMERCIAL

## 2022-08-12 VITALS
WEIGHT: 158 LBS | DIASTOLIC BLOOD PRESSURE: 70 MMHG | BODY MASS INDEX: 29.08 KG/M2 | RESPIRATION RATE: 14 BRPM | SYSTOLIC BLOOD PRESSURE: 112 MMHG | HEIGHT: 62 IN | OXYGEN SATURATION: 98 % | HEART RATE: 78 BPM

## 2022-08-12 DIAGNOSIS — Z87.898 HISTORY OF CHEST PAIN: ICD-10-CM

## 2022-08-12 DIAGNOSIS — I47.1 SVT (SUPRAVENTRICULAR TACHYCARDIA) (HCC): Primary | ICD-10-CM

## 2022-08-12 DIAGNOSIS — E66.3 OVERWEIGHT (BMI 25.0-29.9): ICD-10-CM

## 2022-08-12 PROCEDURE — 99213 OFFICE O/P EST LOW 20 MIN: CPT | Performed by: NURSE PRACTITIONER

## 2022-08-12 PROCEDURE — 93000 ELECTROCARDIOGRAM COMPLETE: CPT | Performed by: NURSE PRACTITIONER

## 2022-08-12 RX ORDER — VITAMIN E 268 MG
400 CAPSULE ORAL DAILY
COMMUNITY

## 2022-08-12 NOTE — PROGRESS NOTES
Room EP 3    Chief Complaint   Patient presents with    SVT    Palpitations     Visit Vitals  /70 (BP 1 Location: Left upper arm, BP Patient Position: Sitting, BP Cuff Size: Adult)   Pulse 78   Resp 14   Ht 5' 2\" (1.575 m)   Wt 158 lb (71.7 kg)   SpO2 98%   BMI 28.90 kg/m²     EKG done today    Needs Metoprolol refill    Chest pain: no    Shortness of breath: no    Edema: no    Palpitations, Skipped beats, Rapid heartbeat: no    Dizziness: no    Fatigue:no    New diagnosis/Surgeries: no    1. Have you been to the ER, urgent care clinic since your last visit? Hospitalized since your last visit? 7/23/22- [facility]: Fall     2. Have you seen or consulted any other health care providers outside of the [address] [place] [address] since your last visit? Include any pap smears or colon screening.  No     Refills: Yes, Metoprolol

## 2022-08-12 NOTE — PROGRESS NOTES
Pema Carney, JESUS  [address]  [place], [address] [facility]    Office [phone]  Fax [phone]          Patient: Deloris Deleon  : 1980      Today's Date: 2022          HISTORY OF PRESENT ILLNESS:     History of Present Illness:  Here for follow-up. Last seen by Dr. Silva Foster in April. Doing well and without cardiac c/o. Patient denies any exertional chest pain, dyspnea, palpitations, syncope, edema, or paroxysmal nocturnal dyspnea. 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 See's Dr. Moreno Maria at [facility] with labs yearly. Also follows with Rheum q6 mon - Dr. Delfina Mccarty with labs. PAST MEDICAL HISTORY:     Past Medical History:   Diagnosis Date    Psoriasis     Psoriatic arthritis ([address]     SVT (supraventricular tachycardia) ([facility] [address]      Current Outpatient Medications on File Prior to Visit   Medication Sig Dispense Refill    vitamin E (AQUA GEMS) 268 mg (400 unit) capsule Take 400 Units by mouth in the morning. metoprolol succinate (TOPROL-XL) 50 mg XL tablet TAKE 1 TABLET BY MOUTH DAILY 90 Tablet 1    guselkumab (Tremfya) 100 mg/mL atIn by SubCUTAneous route. Inject once every 8 weeks for psoriasis and arthritis      Lactobac no.41/Bifidobact no.7 (PROBIOTIC-10 PO) Take  by mouth. albuterol (PROVENTIL HFA, VENTOLIN HFA, PROAIR HFA) 90 mcg/actuation inhaler Take 2 Puffs by inhalation every six (6) hours as needed for Wheezing. 1 Inhaler 0    LO LOESTRIN FE 1 mg-10 mcg (24)/10 mcg (2) tab TK 1 T PO D      multivitamin (ONE A DAY) tablet Take 1 Tab by mouth daily. (Patient not taking: Reported on 2022)      etanercept (ENBREL SC) 1 Dose by SubCUTAneous route every seven (7) days. On  (Patient not taking: Reported on 2022)      fluticasone propionate (Flonase Allergy Relief) 50 mcg/actuation nasal spray 2 Sprays by Both Nostrils route daily.  Indications: nasal congestion (Patient not taking: Reported on 8/12/2022) 1 Bottle 0     No current facility-administered medications on file prior to visit. SOCIAL HISTORY:     Social History     Tobacco Use    Smoking status: Never    Smokeless tobacco: Never   Substance Use Topics    Alcohol use: Not Currently    Drug use: Never         FAMILY HISTORY:     Family History   Problem Relation Age of Onset    Hypertension Mother     Arrhythmia Sister         SVT    Heart Disease Maternal Uncle               REVIEW OF SYMPTOMS:      Review of Symptoms:  Constitutional: Negative for fever, chills  HEENT: Negative for nosebleeds, tinnitus, and vision changes. Respiratory: Negative for cough, wheezing  Cardiovascular: Negative for orthopnea, claudication, syncope, and PND. Gastrointestinal: Negative for abdominal pain, diarrhea, melena. Genitourinary: Negative for dysuria  Musculoskeletal: Negative for myalgias. + joint pain. Skin: Negative for rash  Heme: No problems bleeding. Bruises easily. Neurological: Negative for speech change and focal weakness. PHYSICAL EXAM:      Physical Exam:  Visit Vitals  /70 (BP 1 Location: Left upper arm, BP Patient Position: Sitting, BP Cuff Size: Adult)   Pulse 78   Resp 14   Ht 5' 2\" (1.575 m)   Wt 158 lb (71.7 kg)   SpO2 98%   BMI 28.90 kg/m²     General - well developed well nourished  Neck - neck supple, no JVD  Cardiac - normal S1, S2, RRR, no murmurs, rubs or gallops.  No clicks  Vascular - carotids without bruits, radials pulses equal bilateral  Lungs - clear to auscultation bilaterals, no rales, wheezing or rhonchi  Abd - soft nontender, non-distended, +BS  Extremities - no edema, warm  Neuro - nonfocal  Psych - normal mood and affect       LABS / OTHER STUDIES:      Labs 1/8/20 - [facility] - D-dimer and trop normal, CBC and CMP OK     Lab Results   Component Value Date/Time    Sodium 141 11/03/2019 01:04 PM    Potassium 3.9 11/03/2019 01:04 PM    Chloride 110 (H) 11/03/2019 01:04 PM CO2 23 11/03/2019 01:04 PM    Anion gap 8 11/03/2019 01:04 PM    Glucose 102 (H) 11/03/2019 01:04 PM    BUN 8 11/03/2019 01:04 PM    Creatinine 0.70 11/03/2019 01:04 PM    BUN/Creatinine ratio 11 (L) 11/03/2019 01:04 PM    GFR est AA >60 11/03/2019 01:04 PM    GFR est non-AA >60 11/03/2019 01:04 PM    Calcium 8.8 11/03/2019 01:04 PM     Lab Results   Component Value Date/Time    WBC 11.1 (H) 11/03/2019 01:04 PM    HGB 14.0 11/03/2019 01:04 PM    HCT 43.8 11/03/2019 01:04 PM    PLATELET 699 (H) 45/48/4165 01:04 PM    MCV 82.5 11/03/2019 01:04 PM        CARDIAC DIAGNOSTICS:      Cardiac Evaluation Includes:     EKG 1/8/20 - sinus tach, normal    EKG 1/9/20 - NSR, normal   EKG 11/5/20 - NSR, normal      Abd U/S 1/8/20 - Unremarkable right upper quadrant ultrasound. CXR 1/8/20 - No acute process. Echo July 2014 - LVEF 67%, mild MVP of anterior leaflet, trace MR  Echo May 2016 - LVEF 55-60%, mild MVP anterior leaflet, no MR  Stress test 6/16 - walked 9 min, normal ETT study   Event monitor June 2016 - sinus, 4 and 10 beat run of SVT   Holter 4/27/17 - NSR, brief narrow complex tachycardia, likely sinus tach, cannot rule out atrial tachycardia   Echo 2/21/18 - LVEF 60%, MV leaflets appear normal with trace MR     Event Monitor 2/1/21-3/2/21 - NSR, HR , Avg 85 bpm.  Chest pain, skipped beats symptoms associated with sinus. < 1% PAC/PVC's. Treadmill stress test 4/16/21- walked 7 min (10 METS), normal study     Echo 4/16/21 - LVEF 59%, normal strain     04/16/21    ECHO ADULT COMPLETE 04/16/2021 4/16/2021    Interpretation Summary  · LV: Calculated LVEF is 59%. Biplane method used to measure ejection fraction. Normal cavity size, wall thickness, systolic function (ejection fraction normal) and diastolic function. Wall motion: normal. Normal left ventricular strain.     Signed by: Leon Hoyos MD on 4/16/2021  4:00 PM    EKG today - NSR     ASSESSMENT AND PLAN:      Assessment and Plan:  1) History of SVT / palpitations   - She had been on metoprolol in [place], but this was stopped in [place]   - She prefers medical management instead of ablation   - On 1/29/21 she notes a couple of ED visits for heart racing spells which corrected itself before she could be seen in the ED ---->  Will check a 30 day month monitor. Start Toprol XL 50 mg daily. Asked her to stay hydrated. - On 4/15/21 - She is feeling better on Toprol XL 25. No more resting HR racing spells. Discussed [facility] as an option to capture any future SVT. Cont BB (can try 50 mg) and asked her to stay hydrated before exercising.   - on 8/2022 - pt feels well with current BB dose. Cont      2) hx of Atypical CP  - she notes a history of non-cardiac type chest pain (normal workup in past)   - has electrical shooting type of random chest pain   - pain sounds non-cardiac in nature   - Echo and stress test were normal 4/16/21      3) Overweight - BMI 28.9 - discussed diet and exercise at length. Fu in 1 yr or PRN  Patient expressed understanding of the plan - questions were answered. Used to live in [place]. 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        Amara Robert, ANP

## 2022-09-27 RX ORDER — METOPROLOL SUCCINATE 50 MG/1
TABLET, EXTENDED RELEASE ORAL
Qty: 90 TABLET | Refills: 3 | Status: SHIPPED | OUTPATIENT
Start: 2022-09-27

## 2022-09-27 NOTE — TELEPHONE ENCOUNTER
Refill per VO of Dr. Flor Zamarripa  Last appt: 8/12/2022  Future Appointments   Date Time Provider Molly Mari   8/14/2023  4:20 PM Siria Calvert MD [facility]       Requested Prescriptions     Signed Prescriptions Disp Refills    metoprolol succinate (TOPROL-XL) 50 mg XL tablet 90 Tablet 3     Sig: TAKE 1 TABLET BY MOUTH DAILY     Authorizing Provider: Enrique Celis     Ordering User: Kandice Momin

## 2023-01-23 ENCOUNTER — APPOINTMENT (OUTPATIENT)
Dept: CT IMAGING | Age: 43
End: 2023-01-23
Attending: EMERGENCY MEDICINE
Payer: COMMERCIAL

## 2023-01-23 ENCOUNTER — HOSPITAL ENCOUNTER (OUTPATIENT)
Age: 43
Setting detail: OBSERVATION
Discharge: HOME | End: 2023-01-24
Attending: EMERGENCY MEDICINE | Admitting: HOSPITALIST
Payer: COMMERCIAL

## 2023-01-23 DIAGNOSIS — R56.9 SEIZURE-LIKE ACTIVITY (HCC): Primary | ICD-10-CM

## 2023-01-23 PROBLEM — L40.9 PSORIASIS: Status: ACTIVE | Noted: 2023-01-23

## 2023-01-23 PROBLEM — I47.10 SVT (SUPRAVENTRICULAR TACHYCARDIA): Status: ACTIVE | Noted: 2023-01-23

## 2023-01-23 PROBLEM — I47.1 SVT (SUPRAVENTRICULAR TACHYCARDIA) (HCC): Status: ACTIVE | Noted: 2023-01-23

## 2023-01-23 LAB
ALBUMIN SERPL-MCNC: 3.6 G/DL (ref 3.5–5)
ALBUMIN/GLOB SERPL: 1.1 (ref 1.1–2.2)
ALP SERPL-CCNC: 92 U/L (ref 45–117)
ALT SERPL-CCNC: 18 U/L (ref 12–78)
ANION GAP SERPL CALC-SCNC: 7 MMOL/L (ref 5–15)
APPEARANCE UR: CLEAR
AST SERPL-CCNC: 13 U/L (ref 15–37)
BACTERIA URNS QL MICRO: NEGATIVE /HPF
BASOPHILS # BLD: 0 K/UL (ref 0–0.1)
BASOPHILS NFR BLD: 0 % (ref 0–1)
BILIRUB SERPL-MCNC: 0.4 MG/DL (ref 0.2–1)
BILIRUB UR QL: NEGATIVE
BUN SERPL-MCNC: 9 MG/DL (ref 6–20)
BUN/CREAT SERPL: 14 (ref 12–20)
CALCIUM SERPL-MCNC: 8.6 MG/DL (ref 8.5–10.1)
CHLORIDE SERPL-SCNC: 110 MMOL/L (ref 97–108)
CO2 SERPL-SCNC: 23 MMOL/L (ref 21–32)
COLOR UR: ABNORMAL
COMMENT, HOLDF: NORMAL
CREAT SERPL-MCNC: 0.66 MG/DL (ref 0.55–1.02)
DIFFERENTIAL METHOD BLD: ABNORMAL
EOSINOPHIL # BLD: 0 K/UL (ref 0–0.4)
EOSINOPHIL NFR BLD: 0 % (ref 0–7)
EPITH CASTS URNS QL MICRO: ABNORMAL /LPF
ERYTHROCYTE [DISTWIDTH] IN BLOOD BY AUTOMATED COUNT: 12.7 % (ref 11.5–14.5)
GLOBULIN SER CALC-MCNC: 3.4 G/DL (ref 2–4)
GLUCOSE SERPL-MCNC: 114 MG/DL (ref 65–100)
GLUCOSE UR STRIP.AUTO-MCNC: NEGATIVE MG/DL
HCG UR QL: NEGATIVE
HCT VFR BLD AUTO: 41 % (ref 35–47)
HGB BLD-MCNC: 14.2 G/DL (ref 11.5–16)
HGB UR QL STRIP: ABNORMAL
HYALINE CASTS URNS QL MICRO: ABNORMAL /LPF (ref 0–2)
IMM GRANULOCYTES # BLD AUTO: 0 K/UL (ref 0–0.04)
IMM GRANULOCYTES NFR BLD AUTO: 0 % (ref 0–0.5)
KETONES UR QL STRIP.AUTO: 15 MG/DL
LEUKOCYTE ESTERASE UR QL STRIP.AUTO: NEGATIVE
LYMPHOCYTES # BLD: 1.5 K/UL (ref 0.8–3.5)
LYMPHOCYTES NFR BLD: 12 % (ref 12–49)
MCH RBC QN AUTO: 28.6 PG (ref 26–34)
MCHC RBC AUTO-ENTMCNC: 34.6 G/DL (ref 30–36.5)
MCV RBC AUTO: 82.5 FL (ref 80–99)
MONOCYTES # BLD: 0.6 K/UL (ref 0–1)
MONOCYTES NFR BLD: 5 % (ref 5–13)
NEUTS SEG # BLD: 9.8 K/UL (ref 1.8–8)
NEUTS SEG NFR BLD: 83 % (ref 32–75)
NITRITE UR QL STRIP.AUTO: NEGATIVE
NRBC # BLD: 0 K/UL (ref 0–0.01)
NRBC BLD-RTO: 0 PER 100 WBC
PH UR STRIP: 6 (ref 5–8)
PLATELET # BLD AUTO: 398 K/UL (ref 150–400)
PMV BLD AUTO: 9.2 FL (ref 8.9–12.9)
POTASSIUM SERPL-SCNC: 3.6 MMOL/L (ref 3.5–5.1)
PROLACTIN SERPL-MCNC: 29.6 NG/ML
PROT SERPL-MCNC: 7 G/DL (ref 6.4–8.2)
PROT UR STRIP-MCNC: NEGATIVE MG/DL
RBC # BLD AUTO: 4.97 M/UL (ref 3.8–5.2)
RBC #/AREA URNS HPF: ABNORMAL /HPF (ref 0–5)
SAMPLES BEING HELD,HOLD: NORMAL
SODIUM SERPL-SCNC: 140 MMOL/L (ref 136–145)
SP GR UR REFRACTOMETRY: 1.01 (ref 1–1.03)
UROBILINOGEN UR QL STRIP.AUTO: 0.2 EU/DL (ref 0.2–1)
WBC # BLD AUTO: 11.9 K/UL (ref 3.6–11)
WBC URNS QL MICRO: ABNORMAL /HPF (ref 0–4)

## 2023-01-23 PROCEDURE — 96375 TX/PRO/DX INJ NEW DRUG ADDON: CPT

## 2023-01-23 PROCEDURE — 74011250636 HC RX REV CODE- 250/636: Performed by: EMERGENCY MEDICINE

## 2023-01-23 PROCEDURE — 81025 URINE PREGNANCY TEST: CPT

## 2023-01-23 PROCEDURE — 85025 COMPLETE CBC W/AUTO DIFF WBC: CPT

## 2023-01-23 PROCEDURE — 96374 THER/PROPH/DIAG INJ IV PUSH: CPT

## 2023-01-23 PROCEDURE — G0378 HOSPITAL OBSERVATION PER HR: HCPCS

## 2023-01-23 PROCEDURE — 99285 EMERGENCY DEPT VISIT HI MDM: CPT

## 2023-01-23 PROCEDURE — 36415 COLL VENOUS BLD VENIPUNCTURE: CPT

## 2023-01-23 PROCEDURE — 74011250637 HC RX REV CODE- 250/637: Performed by: HOSPITALIST

## 2023-01-23 PROCEDURE — 65270000029 HC RM PRIVATE

## 2023-01-23 PROCEDURE — 74011000250 HC RX REV CODE- 250: Performed by: HOSPITALIST

## 2023-01-23 PROCEDURE — 93005 ELECTROCARDIOGRAM TRACING: CPT

## 2023-01-23 PROCEDURE — 81001 URINALYSIS AUTO W/SCOPE: CPT

## 2023-01-23 PROCEDURE — 70450 CT HEAD/BRAIN W/O DYE: CPT

## 2023-01-23 PROCEDURE — 84146 ASSAY OF PROLACTIN: CPT

## 2023-01-23 PROCEDURE — 80053 COMPREHEN METABOLIC PANEL: CPT

## 2023-01-23 RX ORDER — LORAZEPAM 2 MG/ML
1 INJECTION INTRAMUSCULAR
Status: DISCONTINUED | OUTPATIENT
Start: 2023-01-23 | End: 2023-01-24 | Stop reason: HOSPADM

## 2023-01-23 RX ORDER — METOPROLOL SUCCINATE 25 MG/1
50 TABLET, EXTENDED RELEASE ORAL DAILY
Status: DISCONTINUED | OUTPATIENT
Start: 2023-01-24 | End: 2023-01-23

## 2023-01-23 RX ORDER — DIPHENHYDRAMINE HYDROCHLORIDE 50 MG/ML
25 INJECTION, SOLUTION INTRAMUSCULAR; INTRAVENOUS
Status: COMPLETED | OUTPATIENT
Start: 2023-01-23 | End: 2023-01-23

## 2023-01-23 RX ORDER — SODIUM CHLORIDE 0.9 % (FLUSH) 0.9 %
5-40 SYRINGE (ML) INJECTION AS NEEDED
Status: DISCONTINUED | OUTPATIENT
Start: 2023-01-23 | End: 2023-01-24 | Stop reason: HOSPADM

## 2023-01-23 RX ORDER — POLYETHYLENE GLYCOL 3350 17 G/17G
17 POWDER, FOR SOLUTION ORAL DAILY PRN
Status: DISCONTINUED | OUTPATIENT
Start: 2023-01-23 | End: 2023-01-24 | Stop reason: HOSPADM

## 2023-01-23 RX ORDER — LEVETIRACETAM 500 MG/5ML
1000 INJECTION, SOLUTION, CONCENTRATE INTRAVENOUS EVERY 12 HOURS
Status: DISCONTINUED | OUTPATIENT
Start: 2023-01-23 | End: 2023-01-24

## 2023-01-23 RX ORDER — PROCHLORPERAZINE EDISYLATE 5 MG/ML
10 INJECTION INTRAMUSCULAR; INTRAVENOUS ONCE
Status: COMPLETED | OUTPATIENT
Start: 2023-01-23 | End: 2023-01-23

## 2023-01-23 RX ORDER — SODIUM CHLORIDE 0.9 % (FLUSH) 0.9 %
5-40 SYRINGE (ML) INJECTION EVERY 8 HOURS
Status: DISCONTINUED | OUTPATIENT
Start: 2023-01-23 | End: 2023-01-24 | Stop reason: HOSPADM

## 2023-01-23 RX ORDER — DIAZEPAM 10 MG/2ML
5 INJECTION INTRAMUSCULAR
Status: COMPLETED | OUTPATIENT
Start: 2023-01-23 | End: 2023-01-23

## 2023-01-23 RX ORDER — METOPROLOL SUCCINATE 25 MG/1
50 TABLET, EXTENDED RELEASE ORAL
Status: DISCONTINUED | OUTPATIENT
Start: 2023-01-23 | End: 2023-01-24 | Stop reason: HOSPADM

## 2023-01-23 RX ORDER — ALBUTEROL SULFATE 0.83 MG/ML
2.5 SOLUTION RESPIRATORY (INHALATION)
Status: DISCONTINUED | OUTPATIENT
Start: 2023-01-23 | End: 2023-01-24 | Stop reason: HOSPADM

## 2023-01-23 RX ORDER — ONDANSETRON 4 MG/1
4 TABLET, ORALLY DISINTEGRATING ORAL
Status: DISCONTINUED | OUTPATIENT
Start: 2023-01-23 | End: 2023-01-24 | Stop reason: HOSPADM

## 2023-01-23 RX ORDER — ONDANSETRON 2 MG/ML
4 INJECTION INTRAMUSCULAR; INTRAVENOUS
Status: DISCONTINUED | OUTPATIENT
Start: 2023-01-23 | End: 2023-01-24 | Stop reason: HOSPADM

## 2023-01-23 RX ORDER — ACETAMINOPHEN 325 MG/1
650 TABLET ORAL
Status: DISCONTINUED | OUTPATIENT
Start: 2023-01-23 | End: 2023-01-24 | Stop reason: HOSPADM

## 2023-01-23 RX ORDER — LANOLIN ALCOHOL/MO/W.PET/CERES
400 CREAM (GRAM) TOPICAL DAILY
Status: DISCONTINUED | OUTPATIENT
Start: 2023-01-24 | End: 2023-01-24 | Stop reason: HOSPADM

## 2023-01-23 RX ORDER — ENOXAPARIN SODIUM 100 MG/ML
40 INJECTION SUBCUTANEOUS DAILY
Status: DISCONTINUED | OUTPATIENT
Start: 2023-01-24 | End: 2023-01-24 | Stop reason: HOSPADM

## 2023-01-23 RX ORDER — ACETAMINOPHEN 650 MG/1
650 SUPPOSITORY RECTAL
Status: DISCONTINUED | OUTPATIENT
Start: 2023-01-23 | End: 2023-01-24 | Stop reason: HOSPADM

## 2023-01-23 RX ADMIN — DIPHENHYDRAMINE HYDROCHLORIDE 25 MG: 50 INJECTION, SOLUTION INTRAMUSCULAR; INTRAVENOUS at 17:11

## 2023-01-23 RX ADMIN — DIAZEPAM 5 MG: 5 INJECTION, SOLUTION INTRAMUSCULAR; INTRAVENOUS at 15:03

## 2023-01-23 RX ADMIN — LEVETIRACETAM 1000 MG: 100 INJECTION, SOLUTION, CONCENTRATE INTRAVENOUS at 15:02

## 2023-01-23 RX ADMIN — PROCHLORPERAZINE EDISYLATE 10 MG: 5 INJECTION INTRAMUSCULAR; INTRAVENOUS at 17:11

## 2023-01-23 RX ADMIN — Medication 10 ML: at 22:00

## 2023-01-23 RX ADMIN — METOPROLOL SUCCINATE 50 MG: 25 TABLET, EXTENDED RELEASE ORAL at 22:00

## 2023-01-23 NOTE — ED TRIAGE NOTES
Pt arrives to ED from work where pt reports she was having palpitations, nausea. Pt also having periods of AMS and \"clicking noises with mouth with head tilted to left\" per EMS. Periods last a few seconds and pt returns to baseline. Pt reports similar issue happened a month ago and had stoke work up at [address] [place] [address] but think it was Narayan Manjarrez dehydration\". Pt reports feeling \"pins and needles all over body\". Pt reports hx of SVT. Denies hx of seizures.

## 2023-01-23 NOTE — ED NOTES
Pt sleeping comfortably at this time. VS stable no signs of acute distress, no seizure like activity at this time.

## 2023-01-23 NOTE — ED PROVIDER NOTES
Ms. Jameson Landau is a 44yo female who presents to the ER with episodes of unresponsiveness. She says she was at work when the symptoms started. She said that she felt like her heart was racing and she felt lightheaded prior. EMS was called. In route, she had multiple episodes of unresponsiveness. It is lasted a brief period time. She is still off to the side. She sometimes has some jerking of her mouth. Her lips are turning blue. She seemed to be apneic during these episodes. They lasted short period of time and she regained consciousness almost immediately afterwards. She has just started to over-the-counter supplements recently. One was marked as well. She does not member the name of the second. She does report that she was admitted to the hospital at another hospital 1 month ago for similar symptoms without clear cause of her symptoms. She denies any other complaints. Past Medical History:   Diagnosis Date    Psoriasis     Psoriatic arthritis ([facility] [address]     SVT (supraventricular tachycardia) ([facility] [address]        No past surgical history on file.       Family History:   Problem Relation Age of Onset    Hypertension Mother     Arrhythmia Sister         SVT    Heart Disease Maternal Uncle        Social History     Socioeconomic History    Marital status:      Spouse name: Not on file    Number of children: Not on file    Years of education: Not on file    Highest education level: Not on file   Occupational History    Not on file   Tobacco Use    Smoking status: Never    Smokeless tobacco: Never   Substance and Sexual Activity    Alcohol use: Not Currently    Drug use: Never    Sexual activity: Not on file   Other Topics Concern    Not on file   Social History Narrative    ** Merged History Encounter **          Social Determinants of Health     Financial Resource Strain: Not on file   Food Insecurity: Not on file   Transportation Needs: Not on file   Physical Activity: Not on file   Stress: Not on file Social Connections: Not on file   Intimate Partner Violence: Not on file   Housing Stability: Not on file         ALLERGIES: [place] Query [tofacitinib], Aspirin, Aspirin, Azithromycin, Azithromycin, Contrast agent [iodine], Humira [adalimumab], Novocain [procaine], Pcn [penicillins], Penicillins, Remicade [infliximab], Sulfa (sulfonamide antibiotics), and Sulfa (sulfonamide antibiotics)    Review of Systems   Constitutional:  Negative for chills and fever. HENT:  Negative for rhinorrhea and sore throat. Respiratory:  Negative for cough and shortness of breath. Cardiovascular:  Negative for chest pain. Gastrointestinal:  Negative for abdominal pain, diarrhea, nausea and vomiting. Genitourinary:  Negative for dysuria and urgency. Musculoskeletal:  Negative for arthralgias and back pain. Skin:  Negative for rash. Neurological:  Positive for seizures. Unresponsive episodes     Vitals:    01/23/23 1424   BP: 134/79   Pulse: (!) 102   Resp: 13   Temp: 97.8 °F (36.6 °C)   SpO2: 100%   Weight: 71.7 kg (158 lb)   Height: 5' 2\" (1.575 m)            Physical Exam     Vital signs reviewed. Nursing notes reviewed. Const:  No acute distress, well developed, well nourished  Head:  Atraumatic, normocephalic  Eyes:  PERRL, conjunctiva normal, no scleral icterus  Neck:  Supple, trachea midline  Cardiovascular: Tachycardic  Resp:  No resp distress, no increased work of breathing  Abd:  Soft, non-tender, non-distended  MSK:  No pedal edema, normal ROM  Neuro:  Alert and oriented x3, no cranial nerve defect, equal strength in bilateral upper and lower extremities, no facial droop  Skin:  Warm, dry, intact  Psych: normal mood and affect, behavior is normal, judgement and thought content is normal          Medical Decision Making  Amount and/or Complexity of Data Reviewed  Labs: ordered. Radiology: ordered. ECG/medicine tests: ordered. Risk  Prescription drug management.   Decision regarding hospitalization. 2:55 PM  She had 2 these episodes while was in the room with her. Sounds somewhat different than when she had in route. She had a brief episode where she started staring off with her head turned to the side. Each of these resolved with a mild painful stimuli. She has not undergone his episodes. He does not become cyanotic. Apparently, they were different than her previous episodes because her symptoms did not last as long and she was not apneic and her lips did not turn blue. Perfect Serve Consult for Admission  5:01 PM    ED Room Number: ER17/17  Patient Name and age:  Vani Alisa 38 y.o.  female  Working Diagnosis:   1. Seizure-like activity ([address]        COVID-19 Suspicion:  no  Sepsis present:  no  Reassessment needed: no  Readmission: no  Isolation Requirements:  no  Recommended Level of Care:  telemetry  Department:[facility] ED - [phone]  Other:  concern for syncope v. Seizure v. Pseudoseizure. Multiple episodes. Given valium and keppra. Ms. Jameson Landau is a 46yo female who presents to the ER for concerns of seizures. She had several episodes witnessed by EMS and by nursing prior to my seeing her. She has had a couple episodes for me. The episodes that I saw seem more like pseudoseizures. However, EMS and nursing described that she was apneic and cyanotic during the previous episodes. The symptoms sound more like seizures. I have given her Keppra and Valium. Her symptoms seem to have improved.   Given the frequency of her symptoms, she likely needs to be admitted to the hospital.  She is to be evaluated for admission by the hospitalist.        Procedures

## 2023-01-23 NOTE — Clinical Note
Patient Class[de-identified] OBSERVATION [104]   Type of Bed: Telemetry [19]   Cardiac Monitoring Required?: Yes   Reason for Observation: seizure   Admitting Diagnosis: Seizure-like activity [facility]) [[id number]]   Admitting Physician: Mauricio Alvarado   Attending Physician: Robin García [[id number]]

## 2023-01-23 NOTE — H&P
[facility]  [address] [facility] [address]  [phone]    Hospitalist Admission Note      NAME:  Giovana Stoll   :   1980   MRN:  [id number]     PCP:  Unknown, Provider     Date/Time of service:  2023 5:36 PM          Subjective:     CHIEF COMPLAINT: Dizziness    HISTORY OF PRESENT ILLNESS:     Ms. Blaine Moritz is a 38 y.o.  female who presented to the Emergency Department complaining of dizziness. 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 She felt nauseated as well. As per the EMS patient had some altered mental status with some clicking noises with her mouth and head tilted to the left. Patient denies any history of seizures. She was admitted with similar symptoms in [facility] in 2020. And she had an EEG done over there which was unremarkable as per the  who is at bedside. She was diagnosed with dehydration at the time. Patient has a history of SVT for more than 20 years and she is on beta-blockers. Patient had many episodes and route to the hospital and in the ER when she had some jaw tremors with her head deviating to the left side and she had blue circumoral coloration. These episodes lasted 20 to 30 seconds. Patient was given IV Keppra in the ER and she was also given IV Benadryl and Compazine. She does complain of headache. She denies any fever or chills. As per the  patient had a Zio patch in the past everything was okay. Past Medical History:   Diagnosis Date    Psoriasis     Psoriatic arthritis ([facility] [address]     SVT (supraventricular tachycardia) ([facility] [address]         No past surgical history on file.     Social History     Tobacco Use    Smoking status: Never    Smokeless tobacco: Never   Substance Use Topics    Alcohol use: Not Currently        Family History   Problem Relation Age of Onset    Hypertension Mother Arrhythmia Sister         SVT    Heart Disease Maternal Uncle         Allergies   Allergen Reactions    Hedda Constantin [Tofacitinib] Anaphylaxis    Aspirin Unknown (comments)    Aspirin Hives    Azithromycin Unknown (comments)    Azithromycin Hives    Contrast Agent [Iodine] Unknown (comments)    Humira [Adalimumab] Hives    Novocain [Procaine] Hives    Pcn [Penicillins] Unknown (comments)    Penicillins Hives    Remicade [Infliximab] Hives    Sulfa (Sulfonamide Antibiotics) Unknown (comments)    Sulfa (Sulfonamide Antibiotics) Hives        Prior to Admission medications    Medication Sig Start Date End Date Taking? Authorizing Provider   metoprolol succinate (TOPROL-XL) 50 mg XL tablet TAKE 1 TABLET BY MOUTH DAILY 9/27/22   Vidal Arce MD   vitamin E (AQUA GEMS) 268 mg (400 unit) capsule Take 400 Units by mouth in the morning. Provider, Historical   guselkumab (Tremfya) 100 mg/mL atIn by SubCUTAneous route. Inject once every 8 weeks for psoriasis and arthritis    Provider, Historical   multivitamin (ONE A DAY) tablet Take 1 Tab by mouth daily. Patient not taking: Reported on 8/12/2022    Provider, Historical   Lactobac no.41/Bifidobact no.7 (PROBIOTIC-10 PO) Take  by mouth. Provider, Historical   etanercept (ENBREL SC) 1 Dose by SubCUTAneous route every seven (7) days. On fridays  Patient not taking: Reported on 8/12/2022    OtherHemalatha MD   fluticasone propionate (Flonase Allergy Relief) 50 mcg/actuation nasal spray 2 Sprays by Both Nostrils route daily. Indications: nasal congestion  Patient not taking: Reported on 8/12/2022 11/5/20   Goran Langston MD   albuterol (PROVENTIL HFA, VENTOLIN HFA, PROAIR HFA) 90 mcg/actuation inhaler Take 2 Puffs by inhalation every six (6) hours as needed for Wheezing.  11/5/20   Goran Langston MD   LO LOESTRIN FE 1 mg-10 mcg (24)/10 mcg (2) tab TK 1 T PO D 1/4/20   Provider, Historical       Review of Systems:  (bold if positive, if negative)    Gen:  Eyes:  ENT:  CVS: Palpitations positive Pulm:  GI: Nausea positive :  MS:  Skin:  Psych:  Endo:  Hem:  Renal:  Neuro: Tremors positive    Objective:      VITALS:    Vital signs reviewed; most recent are:    Visit Vitals  /75   Pulse (!) 119   Temp 97.8 °F (36.6 °C)   Resp 15   Ht 5' 2\" (1.575 m)   Wt 71.7 kg (158 lb)   SpO2 99%   BMI 28.90 kg/m²     SpO2 Readings from Last 6 Encounters:   01/23/23 99%   08/12/22 98%   07/23/22 97%   04/16/21 98%   11/05/20 98%   01/09/20 99%        No intake or output data in the 24 hours ending 01/23/23 1815     Exam:     Physical Exam:    Gen:  Well-developed, well-nourished, in no acute distress  HEENT:  Pink conjunctivae, PERRL, hearing intact to voice, moist mucous membranes  Neck:  Supple, without masses, thyroid non-tender  Resp:  No accessory muscle use, clear breath sounds without wheezes rales or rhonchi  Card:  No murmurs, normal S1, S2 without thrills, bruits or peripheral edema  Abd:  Soft, non-tender, non-distended, normoactive bowel sounds are present, no mass  Lymph:  No cervical or inguinal adenopathy  Musc:  No cyanosis or clubbing  Skin:  No rashes or ulcers, skin turgor is good  Neuro:  Cranial nerves are grossly intact, no focal motor weakness, follows commands appropriately  Psych:  Good insight, oriented to person, place and time, alert     Labs:    Recent Labs     01/23/23  1440   WBC 11.9*   HGB 14.2   HCT 41.0        Recent Labs     01/23/23  1440      K 3.6   *   CO2 23   *   BUN 9   CREA 0.66   CA 8.6   ALB 3.6   TBILI 0.4   ALT 18     No results found for: GLUCPOC  No results for input(s): PH, PCO2, PO2, HCO3, FIO2 in the last 72 hours. No results for input(s): INR, INREXT, INREXT in the last 72 hours.   All Micro Results       None            I have reviewed previous records       Assessment and Plan:      Principal Problem:    Seizure-like activity ([facility] [address] (1/23/2023)    Active Problems:    Psoriasis (1/23/2023)      SVT (supraventricular tachycardia) ([facility] [address] (1/23/2023)       Plan:    1. Seizure-like activity-patient was given Keppra in the ER. I will continue with that. I think we should repeat the EEG. Patient has similar episode almost 2 months back and she had an EEG done at [facility] and it was normal per the . I will get neurology consult. We will give Ativan IV as needed. 2.  History of SVT-I think will need to consult cardiology and she probably needs a Holter monitoring or a loop recorder put in. Continue Toprol-XL. 3.  Psoriatic arthritis-she is on Tremfya and etanercept. 4.  DVT prophylaxis-subcu Lovenox. Patient is a full code    Telemetry reviewed:   normal sinus rhythm    Risk of deterioration: low      Total time spent with patient: 79 Minutes I personally reviewed chart, notes, data and current medications in the medical record. I have personally examined and treated the patient at bedside during this period. To assist coordination of care and communication with nursing and staff, this note may be preliminary early in the day, but finalized by end of the day.                  Care Plan discussed with: Patient and Family    Discussed:  Code Status and Care Plan       ___________________________________________________    Attending Physician: Sara Rainey MD

## 2023-01-23 NOTE — ED TRIAGE NOTES
Patient presenting with intermittent seizure-like activity, demonstrating jaw tremor, head turning deviating to left side, and blue circumoral coloration. Episodes lasting 20 - 30 seconds before patient becoming alert. EMS stated patient presented with similar activity in route.

## 2023-01-24 ENCOUNTER — APPOINTMENT (OUTPATIENT)
Dept: NON INVASIVE DIAGNOSTICS | Age: 43
End: 2023-01-24
Attending: NURSE PRACTITIONER
Payer: COMMERCIAL

## 2023-01-24 ENCOUNTER — APPOINTMENT (OUTPATIENT)
Dept: MRI IMAGING | Age: 43
End: 2023-01-24
Attending: INTERNAL MEDICINE
Payer: COMMERCIAL

## 2023-01-24 ENCOUNTER — APPOINTMENT (OUTPATIENT)
Dept: NON INVASIVE DIAGNOSTICS | Age: 43
End: 2023-01-24
Attending: SPECIALIST
Payer: COMMERCIAL

## 2023-01-24 VITALS
BODY MASS INDEX: 29.08 KG/M2 | OXYGEN SATURATION: 98 % | HEART RATE: 84 BPM | TEMPERATURE: 98.1 F | DIASTOLIC BLOOD PRESSURE: 61 MMHG | WEIGHT: 158 LBS | HEIGHT: 62 IN | SYSTOLIC BLOOD PRESSURE: 108 MMHG | RESPIRATION RATE: 19 BRPM

## 2023-01-24 LAB
ANION GAP SERPL CALC-SCNC: 3 MMOL/L (ref 5–15)
BUN SERPL-MCNC: 8 MG/DL (ref 6–20)
BUN/CREAT SERPL: 10 (ref 12–20)
CALCIUM SERPL-MCNC: 8.4 MG/DL (ref 8.5–10.1)
CHLORIDE SERPL-SCNC: 111 MMOL/L (ref 97–108)
CK SERPL-CCNC: 60 U/L (ref 26–192)
CO2 SERPL-SCNC: 26 MMOL/L (ref 21–32)
COMMENT, HOLDF: NORMAL
CREAT SERPL-MCNC: 0.83 MG/DL (ref 0.55–1.02)
ERYTHROCYTE [DISTWIDTH] IN BLOOD BY AUTOMATED COUNT: 12.9 % (ref 11.5–14.5)
EST. AVERAGE GLUCOSE BLD GHB EST-MCNC: 111 MG/DL
GLUCOSE SERPL-MCNC: 114 MG/DL (ref 65–100)
HBA1C MFR BLD: 5.5 % (ref 4–5.6)
HCT VFR BLD AUTO: 39 % (ref 35–47)
HGB BLD-MCNC: 13.3 G/DL (ref 11.5–16)
MAGNESIUM SERPL-MCNC: 1.9 MG/DL (ref 1.6–2.4)
MCH RBC QN AUTO: 28.7 PG (ref 26–34)
MCHC RBC AUTO-ENTMCNC: 34.1 G/DL (ref 30–36.5)
MCV RBC AUTO: 84.1 FL (ref 80–99)
NRBC # BLD: 0 K/UL (ref 0–0.01)
NRBC BLD-RTO: 0 PER 100 WBC
PHOSPHATE SERPL-MCNC: 3.1 MG/DL (ref 2.6–4.7)
PLATELET # BLD AUTO: 395 K/UL (ref 150–400)
PMV BLD AUTO: 9.4 FL (ref 8.9–12.9)
POTASSIUM SERPL-SCNC: 3.7 MMOL/L (ref 3.5–5.1)
RBC # BLD AUTO: 4.64 M/UL (ref 3.8–5.2)
SAMPLES BEING HELD,HOLD: NORMAL
SODIUM SERPL-SCNC: 140 MMOL/L (ref 136–145)
TSH SERPL DL<=0.05 MIU/L-ACNC: 1.44 UIU/ML (ref 0.36–3.74)
WBC # BLD AUTO: 8 K/UL (ref 3.6–11)

## 2023-01-24 PROCEDURE — 99222 1ST HOSP IP/OBS MODERATE 55: CPT | Performed by: STUDENT IN AN ORGANIZED HEALTH CARE EDUCATION/TRAINING PROGRAM

## 2023-01-24 PROCEDURE — 84443 ASSAY THYROID STIM HORMONE: CPT

## 2023-01-24 PROCEDURE — 93271 ECG/MONITORING AND ANALYSIS: CPT

## 2023-01-24 PROCEDURE — 70551 MRI BRAIN STEM W/O DYE: CPT

## 2023-01-24 PROCEDURE — 80048 BASIC METABOLIC PNL TOTAL CA: CPT

## 2023-01-24 PROCEDURE — 95816 EEG AWAKE AND DROWSY: CPT | Performed by: PSYCHIATRY & NEUROLOGY

## 2023-01-24 PROCEDURE — 36415 COLL VENOUS BLD VENIPUNCTURE: CPT

## 2023-01-24 PROCEDURE — 82550 ASSAY OF CK (CPK): CPT

## 2023-01-24 PROCEDURE — 83036 HEMOGLOBIN GLYCOSYLATED A1C: CPT

## 2023-01-24 PROCEDURE — APPSS30 APP SPLIT SHARED TIME 16-30 MINUTES: Performed by: NURSE PRACTITIONER

## 2023-01-24 PROCEDURE — 96376 TX/PRO/DX INJ SAME DRUG ADON: CPT

## 2023-01-24 PROCEDURE — G0378 HOSPITAL OBSERVATION PER HR: HCPCS

## 2023-01-24 PROCEDURE — 74011250636 HC RX REV CODE- 250/636: Performed by: EMERGENCY MEDICINE

## 2023-01-24 PROCEDURE — 85027 COMPLETE CBC AUTOMATED: CPT

## 2023-01-24 PROCEDURE — 99255 IP/OBS CONSLTJ NEW/EST HI 80: CPT | Performed by: PSYCHIATRY & NEUROLOGY

## 2023-01-24 PROCEDURE — 74011000250 HC RX REV CODE- 250: Performed by: HOSPITALIST

## 2023-01-24 PROCEDURE — 84100 ASSAY OF PHOSPHORUS: CPT

## 2023-01-24 PROCEDURE — 83735 ASSAY OF MAGNESIUM: CPT

## 2023-01-24 RX ORDER — LEVETIRACETAM 500 MG/1
500 TABLET ORAL 2 TIMES DAILY
Qty: 60 TABLET | Refills: 0 | Status: SHIPPED | OUTPATIENT
Start: 2023-01-24

## 2023-01-24 RX ORDER — LEVETIRACETAM 500 MG/5ML
500 INJECTION, SOLUTION, CONCENTRATE INTRAVENOUS EVERY 12 HOURS
Status: DISCONTINUED | OUTPATIENT
Start: 2023-01-24 | End: 2023-01-24 | Stop reason: HOSPADM

## 2023-01-24 RX ADMIN — Medication 10 ML: at 06:00

## 2023-01-24 RX ADMIN — LEVETIRACETAM 1000 MG: 100 INJECTION, SOLUTION, CONCENTRATE INTRAVENOUS at 03:01

## 2023-01-24 NOTE — ED NOTES
Assumed care of pt at this time. Pt resting on stretcher in a position of comfort. Resps are even and unlabored. Skin warm and dry. No distress noted.

## 2023-01-24 NOTE — DISCHARGE INSTRUCTIONS
HOSPITALIST DISCHARGE INSTRUCTIONS  NAME: Asher King   :  1980   MRN:  [id number]     Date/Time:  2023 2:31 PM    ADMIT DATE: 2023     DISCHARGE DATE: 2023     ADMITTING DIAGNOSIS:  Seizure activities, SVT    DISCHARGE DIAGNOSIS:  same    MEDICATIONS:  See after visit summary       It is important that you take the medication exactly as they are prescribed. Keep your medication in the bottles provided by the pharmacist and keep a list of the medication names, dosages, and times to be taken in your wallet. Do not take other medications without consulting your doctor     Pain Management: per above medications    What to do at [address]:  Regular Diet    Recommended activity: no driving until you're cleared by Neurology     1) Return to the hospital if you feel worse    2) If you experience any of the following symptoms then please call your primary care physician or return to the emergency room if you cannot get hold of your doctor:  Fever, chills, nausea, vomiting, diarrhea, change in mentation, falling, bleeding, shortness of breath, chest pain, severe headache, severe abdominal pain,     3) Follow up with your doctors as directed    Follow Up:  Unknown, Provider  Patient not available to ask    Schedule an appointment as soon as possible for a visit in 1 week(s)      Isabella Cramer MD  [address]  [phone]    Schedule an appointment as soon as possible for a visit in 2 week(s)    . Information obtained by :  I understand that if any problems occur once I am at home I am to contact my physician. I understand and acknowledge receipt of the instructions indicated above.                                                                                                                                            Physician's or R.N.'s Signature Date/Time                                                                                                                                              Patient or Representative Signature                                                          Date/Time     Please ensure patient's own \"Lo-Loestrin\" is removed from patient specific bin and returned to patient at discharge. Seizure: Care Instructions  Overview     Seizures are caused by abnormal patterns of electrical signals in the brain. They are different for each person. Seizures can affect movement, speech, vision, or awareness. Some people have only slight shaking of a hand and do not pass out. Other people may pass out and have shaking of the whole body. Some people appear to stare into space. They are awake, but they can't respond normally. Later, they may not remember what happened. You may need tests to identify the type and cause of the seizures. A seizure may occur only once, or you may have them more than one time. Taking medicines as directed and following up with your doctor may help keep you from having more seizures. The doctor has checked you carefully, but problems can develop later. If you notice any problems or new symptoms, get medical treatment right away. Follow-up care is a key part of your treatment and safety. Be sure to make and go to all appointments, and call your doctor if you are having problems. It's also a good idea to know your test results and keep a list of the medicines you take. How can you care for yourself at home? Be safe with medicines. Take your medicines exactly as prescribed. Call your doctor if you think you are having a problem with your medicine. Do not do any activity that could be dangerous to you or others until your doctor says it is safe to do so. For example, do not drive a car, operate machinery, swim, or climb ladders.   Be sure that anyone treating you for any health problem knows that you have had a seizure and what medicines you are taking for it. Identify and avoid things that may make you more likely to have a seizure. These may include lack of sleep, alcohol or drug use, stress, or not eating. If possible, take a shower instead of a bath. Having a seizure while in a bath can increase the risk of drowning. When should you call for help? Call 911 anytime you think you may need emergency care. For example, call if:    You have another seizure. You have new symptoms, such as trouble walking, speaking, or thinking clearly. Call your doctor now or seek immediate medical care if:    You are not acting normally. Watch closely for changes in your health, and be sure to contact your doctor if you have any problems. Where can you learn more? Go to http://www.gray.com/  Enter M769 in the search box to learn more about \"Seizure: Care Instructions. \"  Current as of: December 13, 2021               Content Version: 13.4  © 2006-2022 [employer]. Care instructions adapted under license by [employer] (which disclaims liability or warranty for this information). If you have questions about a medical condition or this instruction, always ask your healthcare professional. [place] [address] any warranty or liability for your use of this information.

## 2023-01-24 NOTE — PROCEDURES
[address]   Electroencephalogram  Report    Procedure ID: [id number] Procedure Date: 1/14/2023   Patient Name: Emilie Cates YOB: 1980   Procedure Type: Routine Medical Record No: [id number]       An EEG is requested in this 82-year-old lady with seizure-like activity to evaluate for epileptiform abnormalities. Medications listed as Keppra and Toprol    This tracings obtained during the awake state. During wakefulness there are brief intermittent runs of posteriorly dominant and symmetric low to medium amplitude 8-8.5 cps activities which attenuate with eye opening. Lower voltage faster frequency activities are seen symmetrically over the anterior head regions. Hyperventilation is performed for 3 minutes and little alters the tracing. Intermittent photic stimulation is not performed secondary to patient's sensitivity to light    Sleep is not attained.     Interpretation  This EEG recorded during the awake state is normal.        Karol Woodall MD

## 2023-01-24 NOTE — ED NOTES
Verbal shift change report given to Alberto Solis RN (oncoming nurse) by Gordo Leyva RN (offgoing nurse). Report included the following information Kardex, ED Summary, Intake/Output, Recent Results, Med Rec Status, and Cardiac Rhythm NSR .

## 2023-01-24 NOTE — DISCHARGE SUMMARY
Ermias Blairelsen [facility] [address]  [address] [facility], [address] [place] [address]  [phone]    Hospitalist Discharge Summary     Patient ID:  Giovana Stoll  [id number]  61 y.o.  1980    Admit date: 1/23/2023    Discharge date and time: 1/24/2023 2:32 PM    Admission Diagnoses: Seizure-like activity ([address] [R56.9]  Seizure ([address] [R56.9]    Discharge Diagnoses:  Principal Diagnosis Seizure-like activity ([address]                                            Principal Problem:    Seizure-like activity ([address] (1/23/2023)    Active Problems:    Psoriasis (1/23/2023)      SVT (supraventricular tachycardia) ([address] (1/23/2023)      Seizure ([address] (1/23/2023)           Hospital Course:     39 yo hx of SVT, psoriatic arthritis, lupus, presented w/ AMS, syncope, seizure-like activity     1) Acute met encephalopathy/syncope/Seizure-like activity: symptoms now resolved. She stated that she developed a \"migraine-like headache\" prior to her episodes. Head CT unremarkable. EEG neg. Head MRI neg. Neuro recommended continuing oral Keppra and follow up as an outpatient      2) SVT: HR stable on monitor. Echo was unremarkable. Cont metoprolol. Cards was following     3) Lupus/psoriatic arthritis: on Temfya    PCP: Unknown, Provider     Consults:  Neuro, Cards    Significant Diagnostic Studies: head MRI, EEG, echo    Discharge Exam:  Physical Exam:    See daily note    Disposition: home  Discharge Condition: Stable    Patient Instructions:   Current Discharge Medication List        START taking these medications    Details   levETIRAcetam (Keppra) 500 mg tablet Take 1 Tablet by mouth two (2) times a day. Qty: 60 Tablet, Refills: 0           CONTINUE these medications which have NOT CHANGED    Details   metoprolol succinate (TOPROL-XL) 50 mg XL tablet TAKE 1 TABLET BY MOUTH DAILY  Qty: 90 Tablet, Refills: 3      vitamin E (AQUA GEMS) 268 mg (400 unit) capsule Take 400 Units by mouth in the morning.       guselkumab (Tremfya) 100 mg/mL atIn by SubCUTAneous route. Inject once every 8 weeks for psoriasis and arthritis      Lactobac no.41/Bifidobact no.7 (PROBIOTIC-10 PO) Take  by mouth. albuterol (PROVENTIL HFA, VENTOLIN HFA, PROAIR HFA) 90 mcg/actuation inhaler Take 2 Puffs by inhalation every six (6) hours as needed for Wheezing.   Qty: 1 Inhaler, Refills: 0      LO LOESTRIN FE 1 mg-10 mcg (24)/10 mcg (2) tab TK 1 T PO D           STOP taking these medications       multivitamin (ONE A DAY) tablet Comments:   Reason for Stopping:         etanercept (ENBREL SC) Comments:   Reason for Stopping:         fluticasone propionate (Flonase Allergy Relief) 50 mcg/actuation nasal spray Comments:   Reason for Stopping:             Activity: no driving   Diet: Regular Diet  Wound Care: None needed    Follow-up with  Follow-up Information       Follow up With Specialties Details Why Contact Info    Unknown, Provider  Schedule an appointment as soon as possible for a visit in 1 week(s)  Patient not available to ask      Anaya Severino MD Neurology Schedule an appointment as soon as possible for a visit in 2 week(s)  [address] [place] [address]  [phone]               Follow-up tests/labs none    Signed:  Parth Pickett MD  1/24/2023  2:32 PM  **I personally spent 35 min on discharge**

## 2023-01-24 NOTE — PROGRESS NOTES
[address] [facility]                    Cardiology Care Note     [x]Initial Encounter     []Follow-up    Patient Name: Tiffany Almanzar - [facility]  Primary Cardiologist: [facility] Cardiology Physicians: Mami Street MD  Consulting Cardiologist: [facility] Cardiology Physicians: Quang Holland DO     Reason for encounter: Palpitations    HPI:       Loren De Santiago is a 38 y.o. female with PMH significant for SVT and psoriatic arthritis. Pt presented to ED with complaints of feeling her heart racing and felt lightheaded while at work, EMS was called. Symptoms all started following a headache earlier in the day. In route, she had multiple episodes of unresponsiveness, with jerking movements of her mouth and head tilted to the left. Episodes were brief, lasting about 20-30 seconds. She has a hx of SVT, diagnosed over 20 years ago, and is on metoprolol for this. She denies any CP, SOB. Of note, she noticed recently on her Fitbit HR seems to be elevated more off and on, sometimes into the 120's. Subjective:      Loren De Santiago reports  no new symptoms . Assessment and Plan     Hx of SVT, palpitations: no arrhythmia noted on tele/EKG here. Monitor lytes. Had been doing well on metoprolol prev, cont. Will order event monitor, repeat echo    2. Seizure-like activity, unresponsiveness: neuro workup on going          ____________________________________________________________    Cardiac testing  21    ECHO ADULT COMPLETE 2021    Interpretation Summary  · LV: Calculated LVEF is 59%. Biplane method used to measure ejection fraction. Normal cavity size, wall thickness, systolic function (ejection fraction normal) and diastolic function. Wall motion: normal. Normal left ventricular strain.     Signed by: Mami Street MD on 2021  4:00 PM    Most recent HS troponins:  No results for input(s): TROPHS in the last 72 hours. No lab exists for component:  CKMB    ECG: sinus tachycardia    Review of Systems:    [x]All other systems reviewed and all negative except as written in HPI    [] Patient unable to provide secondary to condition    Past Medical History:   Diagnosis Date    Psoriasis     Psoriatic arthritis ([address]     SVT (supraventricular tachycardia) (HCC)      No past surgical history on file. Social Hx:  reports that she has never smoked. She has never used smokeless tobacco. She reports that she does not currently use alcohol. She reports that she does not use drugs. Family Hx: family history includes Arrhythmia in her sister; Heart Disease in her maternal uncle; Hypertension in her mother.   Allergies   Allergen Reactions    Williemae Godoy [Tofacitinib] Anaphylaxis    Aspirin Unknown (comments)    Aspirin Hives    Azithromycin Unknown (comments)    Azithromycin Hives    Contrast Agent [Iodine] Unknown (comments)    Humira [Adalimumab] Hives    Novocain [Procaine] Hives    Pcn [Penicillins] Unknown (comments)    Penicillins Hives    Remicade [Infliximab] Hives    Sulfa (Sulfonamide Antibiotics) Unknown (comments)    Sulfa (Sulfonamide Antibiotics) Hives          OBJECTIVE:  Wt Readings from Last 3 Encounters:   01/23/23 71.7 kg (158 lb)   08/12/22 71.7 kg (158 lb)   07/23/22 72.6 kg (160 lb)       Intake/Output Summary (Last 24 hours) at 1/24/2023 1059  Last data filed at 1/24/2023 0859  Gross per 24 hour   Intake --   Output 600 ml   Net -600 ml       Physical Exam:    Vitals:   Vitals:    01/24/23 0432 01/24/23 0630 01/24/23 0700 01/24/23 0856   BP: 104/72 101/75  100/60   Pulse: 78 78 87 80   Resp: 17 18  20   Temp:  98.5 °F (36.9 °C)  98.3 °F (36.8 °C)   SpO2: 97% 98%  98%   Weight:       Height:         Telemetry: normal sinus rhythm    Gen: Well-developed, well-nourished, in no acute distress  Neck: Supple, No JVD, No Carotid Bruit  Resp: No accessory muscle use, Clear breath sounds, No rales or rhonchi  Card: Regular Rate,Rythm, Normal S1, S2, No murmurs, rubs or gallop. No thrills. Abd:   Soft, non-tender, non-distended, BS+   MSK: No cyanosis  Skin: No rashes    Neuro: Moving all four extremities, follows commands appropriately  Psych: Good insight, oriented to person, place, alert, Nml Affect  LE: No edema    Data Review:     Radiology:   XR Results (most recent):  Results from Hospital Encounter encounter on 11/05/20    XR CHEST PA LAT    Narrative  EXAM:  XR CHEST PA LAT    INDICATION:   cough    COMPARISON: Chest radiograph 1/8/2020. FINDINGS: PA and lateral radiographs of the chest were obtained. No evidence of focal consolidation. No pleural effusion or pneumothorax. Heart,  lurdes, mediastinum are within normal limits. No acute osseous abnormalities. Impression  IMPRESSION: No acute cardiopulmonary process. Recent Labs     01/24/23  0351 01/23/23  1440    140   K 3.7 3.6   * 110*   CO2 26 23   BUN 8 9   CREA 0.83 0.66   * 114*   PHOS 3.1  --    CA 8.4* 8.6     Recent Labs     01/24/23  0351 01/23/23  1440   WBC 8.0 11.9*   HGB 13.3 14.2   HCT 39.0 41.0    398     Recent Labs     01/23/23  1440   AP 92     No results for input(s): CHOL, LDLC in the last 72 hours.     No lab exists for component: TGL, HDLC,  HBA1C      Current meds:    Current Facility-Administered Medications:     levETIRAcetam (KEPPRA) injection 1,000 mg, 1,000 mg, IntraVENous, Q12H, Suhas Duggan MD, 1,000 mg at 01/24/23 0301    albuterol (PROVENTIL VENTOLIN) nebulizer solution 2.5 mg, 2.5 mg, Nebulization, Q4H PRN, Warnell Goldberg, Amarinder P, MD    vitamin E acetate capsule 400 Units, 400 Units, Oral, DAILY, Eitan Canchola MD    sodium chloride (NS) flush 5-40 mL, 5-40 mL, IntraVENous, Q8H, Eitan Canchola MD, 10 mL at 01/24/23 0600    sodium chloride (NS) flush 5-40 mL, 5-40 mL, IntraVENous, PRN, Eitan Canchola MD    acetaminophen (TYLENOL) tablet 650 mg, 650 mg, Oral, Q6H PRN **OR** acetaminophen (TYLENOL) suppository 650 mg, 650 mg, Rectal, Q6H PRN, Eitan Canchola MD    polyethylene glycol (MIRALAX) packet 17 g, 17 g, Oral, DAILY PRN, Eitan Canchola MD    ondansetron (ZOFRAN ODT) tablet 4 mg, 4 mg, Oral, Q8H PRN **OR** ondansetron (ZOFRAN) injection 4 mg, 4 mg, IntraVENous, Q6H PRN, Eitan Canchola MD    enoxaparin (LOVENOX) injection 40 mg, 40 mg, SubCUTAneous, DAILY, Eitan Canchola MD    LORazepam (ATIVAN) injection 1 mg, 1 mg, IntraVENous, Q2H PRN, Eitan Canchola MD    metoprolol succinate (TOPROL-XL) XL tablet 50 mg, 50 mg, Oral, QHS, Eitan Canchola MD, 50 mg at 01/23/23 2200    norethindrone-e.estradioL-iron 1 mg-10 mcg (24)/10 mcg (2) tab (Patient Supplied), , Oral, QHS, Eitan Canchola MD, Patient/Family Admin at 01/23/23 2209    Current Outpatient Medications:     metoprolol succinate (TOPROL-XL) 50 mg XL tablet, TAKE 1 TABLET BY MOUTH DAILY, Disp: 90 Tablet, Rfl: 3    vitamin E (AQUA GEMS) 268 mg (400 unit) capsule, Take 400 Units by mouth in the morning., Disp: , Rfl:     guselkumab (Tremfya) 100 mg/mL atIn, by SubCUTAneous route. Inject once every 8 weeks for psoriasis and arthritis, Disp: , Rfl:     multivitamin (ONE A DAY) tablet, Take 1 Tab by mouth daily. (Patient not taking: Reported on 8/12/2022), Disp: , Rfl:     Lactobac no.41/Bifidobact no.7 (PROBIOTIC-10 PO), Take  by mouth., Disp: , Rfl:     etanercept (ENBREL SC), 1 Dose by SubCUTAneous route every seven (7) days. On fridays (Patient not taking: Reported on 8/12/2022), Disp: , Rfl:     fluticasone propionate (Flonase Allergy Relief) 50 mcg/actuation nasal spray, 2 Sprays by Both Nostrils route daily.  Indications: nasal congestion (Patient not taking: Reported on 8/12/2022), Disp: 1 Bottle, Rfl: 0    albuterol (PROVENTIL HFA, VENTOLIN HFA, PROAIR HFA) 90 mcg/actuation inhaler, Take 2 Puffs by inhalation every six (6) hours as needed for Wheezing., Disp: 1 Inhaler, Rfl: 0    LO LOESTRIN FE 1 mg-10 mcg (24)/10 mcg (2) tab, TK 1 T PO D, Disp: , Rfl:     Gianfranco Moreau NP    [facility]  Call center: R) [phone]  (V) [phone]      CC: Unknown, Provider

## 2023-01-24 NOTE — PROGRESS NOTES
Ermias Angulo [facility]  [address] [facility], [address] [place] [address]  [phone]      Hospitalist Progress Note      NAME: Emilie Cates   :  1980  MRM:  [id number]    Date/Time of service: 2023  9:17 AM       Subjective:     Chief Complaint:  Patient was personally seen and examined by me during this time period. Chart reviewed. No further seizures       Objective:       Vitals:       Last 24hrs VS reviewed since prior progress note.  Most recent are:    Visit Vitals  /60 (BP 1 Location: Right upper arm, BP Patient Position: At rest)   Pulse 80   Temp 98.3 °F (36.8 °C)   Resp 20   Ht 5' 2\" (1.575 m)   Wt 71.7 kg (158 lb)   SpO2 98%   BMI 28.90 kg/m²     SpO2 Readings from Last 6 Encounters:   23 98%   22 98%   22 97%   21 98%   20 98%   20 99%          Intake/Output Summary (Last 24 hours) at 2023 7076  Last data filed at 2023 0859  Gross per 24 hour   Intake --   Output 600 ml   Net -600 ml        Exam:     Physical Exam:    Gen:  Well-developed, well-nourished, in no acute distress  HEENT:  Pink conjunctivae, PERRL, hearing intact to voice, moist mucous membranes  Neck:  Supple, without masses, thyroid non-tender  Resp:  No accessory muscle use, clear breath sounds without wheezes rales or rhonchi  Card:  No murmurs, normal S1, S2 without thrills, bruits or peripheral edema  Abd:  Soft, non-tender, non-distended, normoactive bowel sounds are present  Musc:  No cyanosis or clubbing  Skin:  No rashes   Neuro:  Cranial nerves 3-12 are grossly intact, follows commands appropriately  Psych:  Good insight, oriented to person, place and time, alert    Medications Reviewed: (see below)    Lab Data Reviewed: (see below)    ______________________________________________________________________    Medications:     Current Facility-Administered Medications   Medication Dose Route Frequency    levETIRAcetam (KEPPRA) injection 1,000 mg 1,000 mg IntraVENous Q12H    albuterol (PROVENTIL VENTOLIN) nebulizer solution 2.5 mg  2.5 mg Nebulization Q4H PRN    vitamin E acetate capsule 400 Units  400 Units Oral DAILY    sodium chloride (NS) flush 5-40 mL  5-40 mL IntraVENous Q8H    sodium chloride (NS) flush 5-40 mL  5-40 mL IntraVENous PRN    acetaminophen (TYLENOL) tablet 650 mg  650 mg Oral Q6H PRN    Or    acetaminophen (TYLENOL) suppository 650 mg  650 mg Rectal Q6H PRN    polyethylene glycol (MIRALAX) packet 17 g  17 g Oral DAILY PRN    ondansetron (ZOFRAN ODT) tablet 4 mg  4 mg Oral Q8H PRN    Or    ondansetron (ZOFRAN) injection 4 mg  4 mg IntraVENous Q6H PRN    enoxaparin (LOVENOX) injection 40 mg  40 mg SubCUTAneous DAILY    LORazepam (ATIVAN) injection 1 mg  1 mg IntraVENous Q2H PRN    metoprolol succinate (TOPROL-XL) XL tablet 50 mg  50 mg Oral QHS    norethindrone-e.estradioL-iron 1 mg-10 mcg (24)/10 mcg (2) tab (Patient Supplied)   Oral QHS     Current Outpatient Medications   Medication Sig    metoprolol succinate (TOPROL-XL) 50 mg XL tablet TAKE 1 TABLET BY MOUTH DAILY    vitamin E (AQUA GEMS) 268 mg (400 unit) capsule Take 400 Units by mouth in the morning. guselkumab (Tremfya) 100 mg/mL atIn by SubCUTAneous route. Inject once every 8 weeks for psoriasis and arthritis    multivitamin (ONE A DAY) tablet Take 1 Tab by mouth daily. (Patient not taking: Reported on 8/12/2022)    Lactobac no.41/Bifidobact no.7 (PROBIOTIC-10 PO) Take  by mouth.    etanercept (ENBREL SC) 1 Dose by SubCUTAneous route every seven (7) days. On fridays (Patient not taking: Reported on 8/12/2022)    fluticasone propionate (Flonase Allergy Relief) 50 mcg/actuation nasal spray 2 Sprays by Both Nostrils route daily. Indications: nasal congestion (Patient not taking: Reported on 8/12/2022)    albuterol (PROVENTIL HFA, VENTOLIN HFA, PROAIR HFA) 90 mcg/actuation inhaler Take 2 Puffs by inhalation every six (6) hours as needed for Wheezing.     LO LOESTRIN FE 1 mg-10 mcg (24)/10 mcg (2) tab TK 1 T PO D          Lab Review:     Recent Labs     01/24/23  0351 01/23/23  1440   WBC 8.0 11.9*   HGB 13.3 14.2   HCT 39.0 41.0    398     Recent Labs     01/24/23  0351 01/23/23  1440    140   K 3.7 3.6   * 110*   CO2 26 23   * 114*   BUN 8 9   CREA 0.83 0.66   CA 8.4* 8.6   MG 1.9  --    PHOS 3.1  --    ALB  --  3.6   TBILI  --  0.4   ALT  --  18     No results found for: GLUCPOC       Assessment / Plan:     Principal Problem:    39 yo hx of SVT, psoriatic arthritis, lupus, presented w/ AMS, syncope, seizure-like activity    1) Acute met encephalopathy/syncope/Seizure-like activity: symptoms now resolved. She stated that she developed a \"migraine-like headache\" prior to her episodes. Head CT unremarkable. Will obtain head MRI, EEG. Cont empiric IV keppra. Neuro to eval    2) SVT: HR stable on monitor.   Cont metoprolol    3) Lupus/psoriatic arthritis: on Temfya and etanercept     **Prior records, notes, labs, radiology, and medications reviewed in [address] [facility]**    Total time spent with patient care: 35 min  **I personally saw and examined the patient during this time period**                 Care Plan discussed with: Patient, nursing     Discussed:  Care Plan    Prophylaxis:  Lovenox    Disposition:  Home w/Family           ___________________________________________________    Attending Physician: Bj Limon MD

## 2023-01-24 NOTE — CONSULTS
NEUROLOGY IN-PATIENT NEW CONSULTATION      1/24/2023    RE: Salina Hart         1980      REFERRED BY:  Unknown, Provider      CHIEF COMPLAINT:  This is Salina Hart is a 38 y.o. female  who had concerns including Altered mental status and Palpitations. 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 (+) nausea. EMS called who noted \"mental status with some clicking noise in her mouth with head tilt to the left\". In Nov 2020, was admitted at [facility] for similar symptoms. EEG was said to be okay. Diagnosed with dehydration. (+) SVT x 20 yrs on beta blockers. While en rout, patient had more episodes so as placed on Keppra.         ROS  (-) fever  (-) rash  All other systems reviewed and are negative    Past Medical Hx  Past Medical History:   Diagnosis Date    Psoriasis     Psoriatic arthritis (HCC)     SVT (supraventricular tachycardia) (HCC)        Social Hx  Social History     Socioeconomic History    Marital status:    Tobacco Use    Smoking status: Never    Smokeless tobacco: Never   Substance and Sexual Activity    Alcohol use: Not Currently    Drug use: Never   Social History Narrative    ** Merged History Encounter **            Family Hx  Family History   Problem Relation Age of Onset    Hypertension Mother     Arrhythmia Sister         SVT    Heart Disease Maternal Uncle        ALLERGIES  Allergies   Allergen Reactions    Jones Sos [Tofacitinib] Anaphylaxis    Aspirin Unknown (comments)    Aspirin Hives    Azithromycin Unknown (comments)    Azithromycin Hives    Contrast Agent [Iodine] Unknown (comments)    Humira [Adalimumab] Hives    Novocain [Procaine] Hives    Pcn [Penicillins] Unknown (comments)    Penicillins Hives    Remicade [Infliximab] Hives    Sulfa (Sulfonamide Antibiotics) Unknown (comments)    Sulfa (Sulfonamide Antibiotics) Hives       CURRENT MEDS  Current Facility-Administered Medications   Medication Dose Route Frequency Provider Last Rate Last Admin    levETIRAcetam (KEPPRA) injection 1,000 mg  1,000 mg IntraVENous Q12H Nicolas Tirado MD   1,000 mg at 01/24/23 0301    albuterol (PROVENTIL VENTOLIN) nebulizer solution 2.5 mg  2.5 mg Nebulization Q4H PRN Blessing Griffith MD        vitamin E acetate capsule 400 Units  400 Units Oral DAILY Eitan Canchola MD        sodium chloride (NS) flush 5-40 mL  5-40 mL IntraVENous Q8H Eitan Canchola MD   10 mL at 01/24/23 0600    sodium chloride (NS) flush 5-40 mL  5-40 mL IntraVENous PRN Blessing Griffith MD        acetaminophen (TYLENOL) tablet 650 mg  650 mg Oral Q6H PRN Eitan Gtz MD        Or    acetaminophen (TYLENOL) suppository 650 mg  650 mg Rectal Q6H PRN Eitan Canchola MD        polyethylene glycol (MIRALAX) packet 17 g  17 g Oral DAILY PRN Eitan Gtz MD        ondansetron (ZOFRAN ODT) tablet 4 mg  4 mg Oral Q8H PRN Eitan Canchola MD        Or    ondansetron (ZOFRAN) injection 4 mg  4 mg IntraVENous Q6H PRN Eitan Canchola MD        enoxaparin (LOVENOX) injection 40 mg  40 mg SubCUTAneous DAILY Eitan Canchola MD        LORazepam (ATIVAN) injection 1 mg  1 mg IntraVENous Q2H PRN Eitan Canchola MD        metoprolol succinate (TOPROL-XL) XL tablet 50 mg  50 mg Oral QHS Eitan Canchola MD   50 mg at 01/23/23 2200    norethindrone-e.estradioL-iron 1 mg-10 mcg (24)/10 mcg (2) tab (Patient Supplied)   Oral QHS Blessing Griffith MD   Patient/Family Admin at 01/23/23 2209     Current Outpatient Medications   Medication Sig Dispense Refill    metoprolol succinate (TOPROL-XL) 50 mg XL tablet TAKE 1 TABLET BY MOUTH DAILY 90 Tablet 3    vitamin E (AQUA GEMS) 268 mg (400 unit) capsule Take 400 Units by mouth in the morning. guselkumab (Tremfya) 100 mg/mL atIn by SubCUTAneous route. Inject once every 8 weeks for psoriasis and arthritis      multivitamin (ONE A DAY) tablet Take 1 Tab by mouth daily.  (Patient not taking: Reported on 8/12/2022)      Lactobac no.41/Bifidobact no.7 (PROBIOTIC-10 PO) Take  by mouth.      etanercept (ENBREL SC) 1 Dose by SubCUTAneous route every seven (7) days. On fridays (Patient not taking: Reported on 8/12/2022)      fluticasone propionate (Flonase Allergy Relief) 50 mcg/actuation nasal spray 2 Sprays by Both Nostrils route daily. Indications: nasal congestion (Patient not taking: Reported on 8/12/2022) 1 Bottle 0    albuterol (PROVENTIL HFA, VENTOLIN HFA, PROAIR HFA) 90 mcg/actuation inhaler Take 2 Puffs by inhalation every six (6) hours as needed for Wheezing. 1 Inhaler 0    LO LOESTRIN FE 1 mg-10 mcg (24)/10 mcg (2) tab TK 1 T PO D             PREVIOUS WORKUP: (reviewed)  IMAGING:    CT Results (recent):  Results from Hospital Encounter encounter on 01/23/23    CT HEAD WO CONT    Narrative  EXAM: CT HEAD WO CONT    INDICATION: possible seizures? COMPARISON: 7/23/2022. CONTRAST: None. TECHNIQUE: Unenhanced CT of the head was performed using 5 mm images. Brain and  bone windows were generated. Coronal and sagittal reformats. CT dose reduction  was achieved through use of a standardized protocol tailored for this  examination and automatic exposure control for dose modulation. FINDINGS:  The ventricles and sulci are normal in size, shape and configuration. There is  no significant white matter disease. There is no intracranial hemorrhage,  extra-axial collection, or mass effect. The basilar cisterns are open. No CT  evidence of acute infarct. The bone windows demonstrate no abnormalities. The visualized portions of the  paranasal sinuses and mastoid air cells are clear. Impression  No acute abnormality      MRI Results (recent):  Results from Hospital Encounter encounter on 01/23/23    MRI BRAIN WO CONT    Narrative  EXAM: MRI BRAIN WO CONT    INDICATION: new seizures    COMPARISON: CT head 1/23/2023. CONTRAST: None.     TECHNIQUE:  Multiplanar multisequence acquisition without contrast of the brain. FINDINGS:  Evaluation is significantly limited by susceptibility artifact from orthodontic  braces, particularly anteriorly. The ventricles are normal in size and position. The brain parenchyma has normal  signal characteristics. No evidence of acute infarct in the visualized brain  parenchyma, with diffusion sequence significantly limited by artifact. The  mesial temporal lobes and hippocampi appear normal. There is no cerebellar  tonsillar herniation. Expected arterial flow-voids are present. The paranasal sinuses and orbits cannot be evaluated due to susceptibility  artifact. The mastoid air cells and middle ears are clear. No significant  osseous or scalp lesions are identified. Impression  1. No acute or significant intracranial abnormality within limitations of  significant artifact as above. IR Results (recent):  No results found for this or any previous visit. VAS/US Results (recent):  No results found for this or any previous visit. LABS (reviewed)  Results for orders placed or performed during the hospital encounter of 01/23/23   CBC WITH AUTOMATED DIFF   Result Value Ref Range    WBC 11.9 (H) 3.6 - 11.0 K/uL    RBC 4.97 3.80 - 5.20 M/uL    HGB 14.2 11.5 - 16.0 g/dL    HCT 41.0 35.0 - 47.0 %    MCV 82.5 80.0 - 99.0 FL    MCH 28.6 26.0 - 34.0 PG    MCHC 34.6 30.0 - 36.5 g/dL    RDW 12.7 11.5 - 14.5 %    PLATELET 021 557 - 649 K/uL    MPV 9.2 8.9 - 12.9 FL    NRBC 0.0 0  WBC    ABSOLUTE NRBC 0.00 0.00 - 0.01 K/uL    NEUTROPHILS 83 (H) 32 - 75 %    LYMPHOCYTES 12 12 - 49 %    MONOCYTES 5 5 - 13 %    EOSINOPHILS 0 0 - 7 %    BASOPHILS 0 0 - 1 %    IMMATURE GRANULOCYTES 0 0.0 - 0.5 %    ABS. NEUTROPHILS 9.8 (H) 1.8 - 8.0 K/UL    ABS. LYMPHOCYTES 1.5 0.8 - 3.5 K/UL    ABS. MONOCYTES 0.6 0.0 - 1.0 K/UL    ABS. EOSINOPHILS 0.0 0.0 - 0.4 K/UL    ABS. BASOPHILS 0.0 0.0 - 0.1 K/UL    ABS. IMM.  GRANS. 0.0 0.00 - 0.04 K/UL    DF AUTOMATED METABOLIC PANEL, COMPREHENSIVE   Result Value Ref Range    Sodium 140 136 - 145 mmol/L    Potassium 3.6 3.5 - 5.1 mmol/L    Chloride 110 (H) 97 - 108 mmol/L    CO2 23 21 - 32 mmol/L    Anion gap 7 5 - 15 mmol/L    Glucose 114 (H) 65 - 100 mg/dL    BUN 9 6 - 20 MG/DL    Creatinine 0.66 0.55 - 1.02 MG/DL    BUN/Creatinine ratio 14 12 - 20      eGFR >60 >60 ml/min/1.73m2    Calcium 8.6 8.5 - 10.1 MG/DL    Bilirubin, total 0.4 0.2 - 1.0 MG/DL    ALT (SGPT) 18 12 - 78 U/L    AST (SGOT) 13 (L) 15 - 37 U/L    Alk. phosphatase 92 45 - 117 U/L    Protein, total 7.0 6.4 - 8.2 g/dL    Albumin 3.6 3.5 - 5.0 g/dL    Globulin 3.4 2.0 - 4.0 g/dL    A-G Ratio 1.1 1.1 - 2.2     SAMPLES BEING HELD   Result Value Ref Range    SAMPLES BEING HELD ODESSA.SST.GRN. RED     COMMENT        Add-on orders for these samples will be processed based on acceptable specimen integrity and analyte stability, which may vary by analyte.    URINALYSIS W/MICROSCOPIC   Result Value Ref Range    Color YELLOW/STRAW      Appearance CLEAR CLEAR      Specific gravity 1.010 1.003 - 1.030      pH (UA) 6.0 5.0 - 8.0      Protein Negative NEG mg/dL    Glucose Negative NEG mg/dL    Ketone 15 (A) NEG mg/dL    Bilirubin Negative NEG      Blood TRACE (A) NEG      Urobilinogen 0.2 0.2 - 1.0 EU/dL    Nitrites Negative NEG      Leukocyte Esterase Negative NEG      WBC 0-4 0 - 4 /hpf    RBC 0-5 0 - 5 /hpf    Epithelial cells FEW FEW /lpf    Bacteria Negative NEG /hpf    Hyaline cast 0-2 0 - 2 /lpf   PROLACTIN   Result Value Ref Range    Prolactin 29.6 ng/mL   MAGNESIUM   Result Value Ref Range    Magnesium 1.9 1.6 - 2.4 mg/dL   PHOSPHORUS   Result Value Ref Range    Phosphorus 3.1 2.6 - 4.7 MG/DL   CK   Result Value Ref Range    CK 60 26 - 192 U/L   TSH 3RD GENERATION   Result Value Ref Range    TSH 1.44 0.36 - 3.74 uIU/mL   HEMOGLOBIN A1C WITH EAG   Result Value Ref Range    Hemoglobin A1c 5.5 4.0 - 5.6 %    Est. average glucose 111 mg/dL   CBC W/O DIFF   Result Value Ref Range    WBC 8.0 3.6 - 11.0 K/uL    RBC 4.64 3.80 - 5.20 M/uL    HGB 13.3 11.5 - 16.0 g/dL    HCT 39.0 35.0 - 47.0 %    MCV 84.1 80.0 - 99.0 FL    MCH 28.7 26.0 - 34.0 PG    MCHC 34.1 30.0 - 36.5 g/dL    RDW 12.9 11.5 - 14.5 %    PLATELET 762 497 - 928 K/uL    MPV 9.4 8.9 - 12.9 FL    NRBC 0.0 0  WBC    ABSOLUTE NRBC 0.00 0.00 - 5.53 K/uL   METABOLIC PANEL, BASIC   Result Value Ref Range    Sodium 140 136 - 145 mmol/L    Potassium 3.7 3.5 - 5.1 mmol/L    Chloride 111 (H) 97 - 108 mmol/L    CO2 26 21 - 32 mmol/L    Anion gap 3 (L) 5 - 15 mmol/L    Glucose 114 (H) 65 - 100 mg/dL    BUN 8 6 - 20 MG/DL    Creatinine 0.83 0.55 - 1.02 MG/DL    BUN/Creatinine ratio 10 (L) 12 - 20      eGFR >60 >60 ml/min/1.73m2    Calcium 8.4 (L) 8.5 - 10.1 MG/DL   SAMPLES BEING HELD   Result Value Ref Range    SAMPLES BEING HELD 1LAV,1PST     COMMENT        Add-on orders for these samples will be processed based on acceptable specimen integrity and analyte stability, which may vary by analyte. HCG URINE, QL. - POC   Result Value Ref Range    Pregnancy test,urine (POC) Negative NEG         Physical Exam:   Visit Vitals  BP (!) 100/56   Pulse 85   Temp 98.3 °F (36.8 °C)   Resp 22   Ht 5' 2\" (1.575 m)   Wt 71.7 kg (158 lb)   SpO2 98%   BMI 28.90 kg/m²     General:  Alert, cooperative, no distress. Head:  Normocephalic, without obvious abnormality, atraumatic. Eyes:  Conjunctivae/corneas clear. Lungs:  Heart:   Non labored breathing  Regular rate and rhythm, no carotid bruits   Abdomen:   Soft, non-distended   Extremities: Extremities normal, atraumatic, no cyanosis or edema. Pulses: 2+ and symmetric all extremities. Skin: Skin color, texture, turgor normal. No rashes or lesions.   Neurologic Exam     Gen: Attention normal             Language: naming, repetition, fluency normal             Memory: intact recent and remote memory  Cranial Nerves:  I: smell Not tested   II: visual fields Full to confrontation   II: pupils Equal, round, reactive to light   II: optic disc No papilledema   III,VII: ptosis none   III,IV,VI: extraocular muscles  Full ROM   V: mastication normal   V: facial light touch sensation  normal   VII: facial muscle function   symmetric   VIII: hearing symmetric   IX: soft palate elevation  normal   XI: trapezius strength  5/5   XI: sternocleidomastoid strength 5/5   XI: neck flexion strength  5/5   XII: tongue  midline     Motor: normal bulk and tone, no tremor              Strength: 5/5 all four extremities  Sensory: intact to LT, PP, vibration, and temperature  Reflexes: 2+ throughout; Down going toes  Coordination: Good FTN and HTS  Gait: deferred           Impression:     Valerie Hwang is a 38 y.o. female who  has a past medical history of Psoriasis, Psoriatic arthritis ([facility] [address] and SVT (supraventricular tachycardia) ([facility] [address] 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 (+) nausea. EMS called who noted \"mental status with some clicking noise in her mouth with head tilt to the left\". In Nov 2020, was admitted at [facility] for similar symptoms. EEG was said to be okay. Diagnosed with dehydration. (+) SVT x 20 yrs on beta blockers. Consideration includes vasovagal syncope (headache, palpitation, lightheadedness) exacerbated by dehydration (admits to not drink enough fluid). Differentials include seizures (although these more likely are convulsive syncope type. MRI brain normal    RECOMMENDATIONS  1. I had a long discussion with patient and . Discussed diagnosis, prognosis, pathophysiology and available treatment. Reviewed test results. All questions were answered. 2. Discussed MRI brain was unremarkable  3. EEG  4. Continue Keppra 500 mg BID for now  5. Cardiology consulted and recommended event monitor. Of note, BP tends to be in the low side.  Will defer to them regarding adjustment of Toprol    Will consider ANS testing as out patient depending on above      Please call for questions        Thank you for the consultation      Enrrique Alston MD  Diplomate, American Board of Psychiatry and Neurology  Diplomate, Neuromuscular Medicine  Diplomate, American Board of Electrodiagnostic Medicine    Greater than 50% of time spent counseling patient        CC: Unknown, Provider  Fax: None

## 2023-01-24 NOTE — ED NOTES
Dr. Aram Concepcion made aware of pts soft Bps. States he is currently okay with pressure via perfect serve.

## 2023-01-25 LAB
ATRIAL RATE: 101 BPM
CALCULATED P AXIS, ECG09: 52 DEGREES
CALCULATED R AXIS, ECG10: 41 DEGREES
CALCULATED T AXIS, ECG11: 8 DEGREES
DIAGNOSIS, 93000: NORMAL
P-R INTERVAL, ECG05: 136 MS
Q-T INTERVAL, ECG07: 346 MS
QRS DURATION, ECG06: 76 MS
QTC CALCULATION (BEZET), ECG08: 448 MS
VENTRICULAR RATE, ECG03: 101 BPM

## 2023-02-01 ENCOUNTER — TELEPHONE (OUTPATIENT)
Dept: CARDIOLOGY CLINIC | Age: 43
End: 2023-02-01

## 2023-02-01 NOTE — TELEPHONE ENCOUNTER
Pt stated her heart rate this morning was 171 and it took 3 hrs to come down, pt stated heart rate is 105 now, would like a call back from the nurse as she has questions, pt was recently in the hospital and made a follow up appt on 2/14/23, please advise          [phone]

## 2023-02-01 NOTE — TELEPHONE ENCOUNTER
R/t call to pt,  Feeling ok today. She was at pt 1st 2 days ago: sinus, ear infection. Now on steroids, antibiotics. Ever since out of hospital (1/23/23), pulse hasn't gone under 100. She works with babies; yesterday, changed a diaper, sat down since she wasn't feeling well. Felt flutters, fit bit said pulse was 171. It took 3 hours to go down to 110 yesterday. In ED, CO[facility] Gandara wanted to increase HR medication, however she has lower BP, so they didn't change anything. /50's while in hospital.    Confirmed taking Metoprolol 50 mg daily. She heard from company and they are sending heart monitor by the end of the week. Pulse runs 120-130's normally. Has been drinking liquid IV to help stay hydrated. I asked her to look up Vagal maneuvers if this happened again. She is asking at what point should she head to ED if pulse goes out of control again? Asked pt to keep track of BP at home and bring log to [address]. Pt scheduled for OV w Dr. Denny Console 2/14 d/t work schedule.

## 2023-02-02 ENCOUNTER — TELEPHONE (OUTPATIENT)
Dept: CARDIOLOGY CLINIC | Age: 43
End: 2023-02-02

## 2023-02-02 NOTE — TELEPHONE ENCOUNTER
R/t call, LVM  Per Dr. Feliz Cui: \"  She had sinus tach in hospital.  Suspect that is what she has. Need monitor results to see if she is having PSVT to see if need to increase BB or not. For now stay well hydrated as primary rec. If HR is racing fast > 30 min and she feels poorly, then go to ED.      \"

## 2023-02-14 ENCOUNTER — OFFICE VISIT (OUTPATIENT)
Dept: CARDIOLOGY CLINIC | Age: 43
End: 2023-02-14
Payer: COMMERCIAL

## 2023-02-14 VITALS
SYSTOLIC BLOOD PRESSURE: 118 MMHG | OXYGEN SATURATION: 98 % | HEIGHT: 62 IN | HEART RATE: 73 BPM | DIASTOLIC BLOOD PRESSURE: 74 MMHG | WEIGHT: 158 LBS | BODY MASS INDEX: 29.08 KG/M2

## 2023-02-14 DIAGNOSIS — I47.1 SVT (SUPRAVENTRICULAR TACHYCARDIA) (HCC): ICD-10-CM

## 2023-02-14 DIAGNOSIS — R42 DIZZINESS: Primary | ICD-10-CM

## 2023-02-14 PROCEDURE — 99214 OFFICE O/P EST MOD 30 MIN: CPT | Performed by: SPECIALIST

## 2023-02-14 NOTE — PROGRESS NOTES
Chief Complaint   Patient presents with    Follow-up     ER, wearing a loop monitor. Vitals:    02/14/23 1537   BP: 120/80   BP 1 Location: Left upper arm   BP Patient Position: Sitting   Pulse: 73   Height: 5' 2\" (1.575 m)   Weight: 158 lb (71.7 kg)   SpO2: 98%       Chest pain denied     SOB denied     Palpitations - yes     Swelling in hands/feet denied     Dizziness - yes Associated with racing heart rate.      Recent hospital stays denied     Refills denied

## 2023-02-14 NOTE — PROGRESS NOTES
Radha Pettit MD. [facility]              Patient: Eugene Ho  : 1980      Today's Date: 2023          HISTORY OF PRESENT ILLNESS:     History of Present Illness:  Here for hospital follow-up. She was feeling better on Toprol XL 50 mg daily. Feels heart racing, gets dizziness with HR >140. Feels each episode start with migraines followed by elevated HR. Neuro appointment in May. PAST MEDICAL HISTORY:     Past Medical History:   Diagnosis Date    Psoriasis     Psoriatic arthritis (HCC)     SVT (supraventricular tachycardia) (HCC)            MEDICATIONS:     Current Outpatient Medications   Medication Sig Dispense Refill    metoprolol succinate (TOPROL-XL) 50 mg XL tablet TAKE 1 TABLET BY MOUTH DAILY 90 Tablet 3    vitamin E (AQUA GEMS) 268 mg (400 unit) capsule Take 400 Units by mouth in the morning. guselkumab (Tremfya) 100 mg/mL atIn by SubCUTAneous route. Inject once every 8 weeks for psoriasis and arthritis      Lactobac no.41/Bifidobact no.7 (PROBIOTIC-10 PO) Take  by mouth. albuterol (PROVENTIL HFA, VENTOLIN HFA, PROAIR HFA) 90 mcg/actuation inhaler Take 2 Puffs by inhalation every six (6) hours as needed for Wheezing. 1 Inhaler 0    LO LOESTRIN FE 1 mg-10 mcg (24)/10 mcg (2) tab TK 1 T PO D      levETIRAcetam (Keppra) 500 mg tablet Take 1 Tablet by mouth two (2) times a day.  60 Tablet 0       Allergies   Allergen Reactions    Xeljanz [Tofacitinib] Anaphylaxis    Aspirin Unknown (comments)    Aspirin Hives    Azithromycin Unknown (comments)    Azithromycin Hives    Contrast Agent [Iodine] Unknown (comments)    Humira [Adalimumab] Hives    Novocain [Procaine] Hives    Pcn [Penicillins] Unknown (comments)    Penicillins Hives    Remicade [Infliximab] Hives    Sulfa (Sulfonamide Antibiotics) Unknown (comments)    Sulfa (Sulfonamide Antibiotics) Hives           SOCIAL HISTORY:     Social History     Tobacco Use    Smoking status: Never    Smokeless tobacco: Never Substance Use Topics    Alcohol use: Not Currently    Drug use: Never         FAMILY HISTORY:     Family History   Problem Relation Age of Onset    Hypertension Mother     Arrhythmia Sister         SVT    Heart Disease Maternal Uncle               REVIEW OF SYMPTOMS:      Review of Symptoms:  Constitutional: Negative for fever, chills  HEENT: Negative for nosebleeds, tinnitus, and vision changes. Respiratory: Negative for cough, wheezing  Cardiovascular: Negative for orthopnea, claudication, syncope, and PND. Gastrointestinal: Negative for abdominal pain, diarrhea, melena. Genitourinary: Negative for dysuria  Musculoskeletal: Negative for myalgias. + joint pain. Skin: Negative for rash  Heme: No problems bleeding. Bruises easily. Neurological: Negative for speech change and focal weakness. PHYSICAL EXAM:      Physical Exam:  Visit Vitals  /80 (BP 1 Location: Left upper arm, BP Patient Position: Sitting)   Pulse 73   Ht 5' 2\" (1.575 m)   Wt 158 lb (71.7 kg)   SpO2 98%   BMI 28.90 kg/m²            Patient appears generally well, mood and affect are appropriate and pleasant. HEENT:  Hearing intact, non-icteric, normocephalic, atraumatic. Neck Exam: Supple, No JVD   Lung Exam: Clear to auscultation, even breath sounds. Cardiac Exam: Regular rate and rhythm with no murmur or rub  Abdomen: Soft, non-tender  Extremities: Moves all ext well. No lower extremity edema. Psych: Appropriate affect  Neuro - Grossly intact           LABS / OTHER STUDIES:            Labs 1/8/20 - [facility] - D-dimer and trop normal, CBC and CMP OK         CARDIAC DIAGNOSTICS:      Cardiac Evaluation Includes:     EKG 1/8/20 - sinus tach, normal    EKG 1/9/20 - NSR, normal   EKG 11/5/20 - NSR, normal   EKG 1/23/23 - sinus tach, normal      Abd U/S 1/8/20 - Unremarkable right upper quadrant ultrasound. CXR 1/8/20 - No acute process.      Echo July 2014 - LVEF 67%, mild MVP of anterior leaflet, trace MR  Echo May 2016 - LVEF 55-60%, mild MVP anterior leaflet, no MR  Stress test 6/16 - walked 9 min, normal ETT study   Event monitor June 2016 - sinus, 4 and 10 beat run of SVT   Holter 4/27/17 - NSR, brief narrow complex tachycardia, likely sinus tach, cannot rule out atrial tachycardia   Echo 2/21/18 - LVEF 60%, MV leaflets appear normal with trace MR      Event Monitor 2/1/21-3/2/21 - NSR, HR , Avg 85 bpm.  Chest pain, skipped beats symptoms associated with sinus. < 1% PAC/PVC's. Treadmill stress test 4/16/21- walked 7 min (10 METS), normal study     Echo 4/16/21 - LVEF 59%, normal strain             ASSESSMENT AND PLAN:      Assessment and Plan:  1) History of SVT / palpitations   - She had been on metoprolol in [place], but this was stopped in [place]   - She prefers medical management instead of ablation   - On 1/29/21 she notes a couple of ED visits for heart racing spells which corrected itself before she could be seen in the ED ---->  Will check a 30 day month monitor. Start Toprol XL 50 mg daily. Asked her to stay hydrated. - On 4/15/21 - She is feeling better on Toprol XL 25. No more resting HR racing spells. Discussed [facility] as an option to capture any future SVT. Cont BB (can try 50 mg) and asked her to stay hydrated before exercising.   - In 9/22 - Toprol increased to 50mg daily  - Seen in ED 1/23 for seizure like activity --> event monitor ordered then for palpitations.      ---> Her event monitor (2/23) shows normal findings thus far ----> Based on monitor findings, an SVT is not the cause of her dizziness      2) Atypical CP  - she notes a history of non-cardiac type chest pain (normal workup in past)   - has electrical shooting type of random chest pain   - prior pain sounds non-cardiac in nature   - Echo and stress test were normal 4/16/21   - no recent CP     3) Dizziness   - she has random spells of dizziness -- HR can be fast or normal during spells  - lasts 5-10 min   - waking in AM and moving fast can cause dizziness   - seeing neuro in May   - Refer to ENT for dizziness   - orthostatic vitals 2/23 looked OK     4) See NP in 6 weeks   Used to live in [place]. 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 Marian Loco MD, [address]  [address] [place] [address], [address]      [place] [address]  [address]  [address]  Ph: [phone]                               Ph [phone]

## 2023-06-27 ENCOUNTER — TRANSCRIBE ORDERS (OUTPATIENT)
Facility: HOSPITAL | Age: 43
End: 2023-06-27

## 2023-06-27 DIAGNOSIS — Z12.31 SCREENING MAMMOGRAM FOR HIGH-RISK PATIENT: Primary | ICD-10-CM

## 2023-07-20 ENCOUNTER — HOSPITAL ENCOUNTER (OUTPATIENT)
Facility: HOSPITAL | Age: 43
Discharge: HOME | End: 2023-07-20
Payer: COMMERCIAL

## 2023-07-20 DIAGNOSIS — Z12.31 SCREENING MAMMOGRAM FOR HIGH-RISK PATIENT: ICD-10-CM

## 2023-07-20 PROCEDURE — 77067 SCR MAMMO BI INCL CAD: CPT

## 2023-08-29 ENCOUNTER — PATIENT MESSAGE (OUTPATIENT)
Age: 43
End: 2023-08-29

## 2023-08-29 ENCOUNTER — TELEPHONE (OUTPATIENT)
Age: 43
End: 2023-08-29

## 2023-08-29 NOTE — TELEPHONE ENCOUNTER
Pt stated she went to the ER on 8/28/23 for SVT, pt called to make a same day appt with Dr. Winston Cohen, informed pt that there is an appt for 8/30/23 with Dr. Winston Cohen, made that appt but pt would like a call back from the nurse, stated she has been having an elevated heart rate, feeling nauseous, light headed and sweaty.        Pt# [phone]

## 2023-08-29 NOTE — TELEPHONE ENCOUNTER
8/28/23 JW for SVT (requested records)    Pt from [facility], \"I get these episodes where my heart rate gets into the 140-160s and I'm not sure what's going on. These episodes only happen like 2-3 times a year. They make me feel light headed, tired and pens and needles in both hands. I been taking my metoprolol every night like normal.  Should I up my dosage of medicine or change medicine. \"    Future Appointments   Date Time Provider [address]   8/31/2023  1:20 PM MD MANJIT Burt BS AMB   9/18/2023  4:00 PM MD MANJIT Brut BS AMB

## 2023-08-30 ENCOUNTER — TELEPHONE (OUTPATIENT)
Age: 43
End: 2023-08-30

## 2023-08-30 NOTE — TELEPHONE ENCOUNTER
Spoke with patient concerning elevated heart rate, n/v, feeling like she is \"in and out\". (Patient confirmed with 2 identifiers). Patient is calling to inquire if she can make adjustments to medications d/t not feeling well. She stated that she was at work, her HR went down to 30 nd the back up as high as 160s. She became diaphoretic and nauseas. Advised patient of previous messages about being seen tomorrow at 120p and if her HR>120 bpm with not feeling well she should seek urgent care. Patient states she cannot keep taking time off work, so she cancelled that appointment. Advised patient that she would need to come into office and be evaluated prior to Dr. Christopher Ervin being able to adjust meds or change treatment plan. Rescheduled her appointment for 8/31/23 at [facility]. Patient will call her manager and let her know. Also advised patient that we could write her a note for work if needed for time missed for appointment tomorrow. Patient expressed understanding. Opportunities for questions, clarifications, and concerns provided.

## 2023-09-18 ENCOUNTER — HOSPITAL ENCOUNTER (EMERGENCY)
Facility: HOSPITAL | Age: 43
Discharge: HOME | End: 2023-09-18
Attending: EMERGENCY MEDICINE
Payer: COMMERCIAL

## 2023-09-18 ENCOUNTER — CLINICAL DOCUMENTATION (OUTPATIENT)
Age: 43
End: 2023-09-18
Payer: COMMERCIAL

## 2023-09-18 VITALS
SYSTOLIC BLOOD PRESSURE: 132 MMHG | HEIGHT: 62 IN | RESPIRATION RATE: 13 BRPM | BODY MASS INDEX: 29.08 KG/M2 | HEART RATE: 100 BPM | OXYGEN SATURATION: 100 % | TEMPERATURE: 98.6 F | WEIGHT: 158 LBS | DIASTOLIC BLOOD PRESSURE: 91 MMHG

## 2023-09-18 DIAGNOSIS — I47.1 SVT (SUPRAVENTRICULAR TACHYCARDIA) (HCC): Primary | ICD-10-CM

## 2023-09-18 DIAGNOSIS — R55 NEAR SYNCOPE: ICD-10-CM

## 2023-09-18 DIAGNOSIS — R00.0 TACHYCARDIA: Primary | ICD-10-CM

## 2023-09-18 LAB
ALBUMIN SERPL-MCNC: 3.6 G/DL (ref 3.5–5)
ALBUMIN/GLOB SERPL: 1.1 (ref 1.1–2.2)
ALP SERPL-CCNC: 120 U/L (ref 45–117)
ALT SERPL-CCNC: 22 U/L (ref 12–78)
ANION GAP SERPL CALC-SCNC: 6 MMOL/L (ref 5–15)
AST SERPL-CCNC: 12 U/L (ref 15–37)
BASOPHILS # BLD: 0.1 K/UL (ref 0–0.1)
BASOPHILS NFR BLD: 1 % (ref 0–1)
BILIRUB SERPL-MCNC: 0.2 MG/DL (ref 0.2–1)
BUN SERPL-MCNC: 8 MG/DL (ref 6–20)
BUN/CREAT SERPL: 9 (ref 12–20)
CALCIUM SERPL-MCNC: 8.8 MG/DL (ref 8.5–10.1)
CHLORIDE SERPL-SCNC: 110 MMOL/L (ref 97–108)
CO2 SERPL-SCNC: 25 MMOL/L (ref 21–32)
COMMENT:: NORMAL
CREAT SERPL-MCNC: 0.87 MG/DL (ref 0.55–1.02)
DIFFERENTIAL METHOD BLD: ABNORMAL
EOSINOPHIL # BLD: 0.1 K/UL (ref 0–0.4)
EOSINOPHIL NFR BLD: 1 % (ref 0–7)
ERYTHROCYTE [DISTWIDTH] IN BLOOD BY AUTOMATED COUNT: 12.6 % (ref 11.5–14.5)
GLOBULIN SER CALC-MCNC: 3.4 G/DL (ref 2–4)
GLUCOSE BLD STRIP.AUTO-MCNC: 107 MG/DL (ref 65–117)
GLUCOSE SERPL-MCNC: 116 MG/DL (ref 65–100)
HCT VFR BLD AUTO: 43.2 % (ref 35–47)
HGB BLD-MCNC: 14.5 G/DL (ref 11.5–16)
IMM GRANULOCYTES # BLD AUTO: 0 K/UL (ref 0–0.04)
IMM GRANULOCYTES NFR BLD AUTO: 0 % (ref 0–0.5)
LYMPHOCYTES # BLD: 2.3 K/UL (ref 0.8–3.5)
LYMPHOCYTES NFR BLD: 32 % (ref 12–49)
MAGNESIUM SERPL-MCNC: 2 MG/DL (ref 1.6–2.4)
MCH RBC QN AUTO: 28.2 PG (ref 26–34)
MCHC RBC AUTO-ENTMCNC: 33.6 G/DL (ref 30–36.5)
MCV RBC AUTO: 84 FL (ref 80–99)
MONOCYTES # BLD: 0.8 K/UL (ref 0–1)
MONOCYTES NFR BLD: 12 % (ref 5–13)
NEUTS SEG # BLD: 3.8 K/UL (ref 1.8–8)
NEUTS SEG NFR BLD: 54 % (ref 32–75)
NRBC # BLD: 0 K/UL (ref 0–0.01)
NRBC BLD-RTO: 0 PER 100 WBC
PLATELET # BLD AUTO: 405 K/UL (ref 150–400)
PMV BLD AUTO: 9.4 FL (ref 8.9–12.9)
POTASSIUM SERPL-SCNC: 4 MMOL/L (ref 3.5–5.1)
PROT SERPL-MCNC: 7 G/DL (ref 6.4–8.2)
RBC # BLD AUTO: 5.14 M/UL (ref 3.8–5.2)
SERVICE CMNT-IMP: NORMAL
SODIUM SERPL-SCNC: 141 MMOL/L (ref 136–145)
SPECIMEN HOLD: NORMAL
TROPONIN I SERPL HS-MCNC: <4 NG/L (ref 0–51)
WBC # BLD AUTO: 7.1 K/UL (ref 3.6–11)

## 2023-09-18 PROCEDURE — 93000 ELECTROCARDIOGRAM COMPLETE: CPT | Performed by: SPECIALIST

## 2023-09-18 PROCEDURE — 2580000003 HC RX 258: Performed by: EMERGENCY MEDICINE

## 2023-09-18 PROCEDURE — 82962 GLUCOSE BLOOD TEST: CPT

## 2023-09-18 PROCEDURE — 99284 EMERGENCY DEPT VISIT MOD MDM: CPT

## 2023-09-18 PROCEDURE — 96361 HYDRATE IV INFUSION ADD-ON: CPT

## 2023-09-18 PROCEDURE — 93005 ELECTROCARDIOGRAM TRACING: CPT | Performed by: EMERGENCY MEDICINE

## 2023-09-18 PROCEDURE — 84484 ASSAY OF TROPONIN QUANT: CPT

## 2023-09-18 PROCEDURE — 83735 ASSAY OF MAGNESIUM: CPT

## 2023-09-18 PROCEDURE — 36415 COLL VENOUS BLD VENIPUNCTURE: CPT

## 2023-09-18 PROCEDURE — 96360 HYDRATION IV INFUSION INIT: CPT

## 2023-09-18 PROCEDURE — 80053 COMPREHEN METABOLIC PANEL: CPT

## 2023-09-18 PROCEDURE — 85025 COMPLETE CBC W/AUTO DIFF WBC: CPT

## 2023-09-18 RX ORDER — 0.9 % SODIUM CHLORIDE 0.9 %
1000 INTRAVENOUS SOLUTION INTRAVENOUS ONCE
Status: COMPLETED | OUTPATIENT
Start: 2023-09-18 | End: 2023-09-18

## 2023-09-18 RX ADMIN — SODIUM CHLORIDE 1000 ML: 9 INJECTION, SOLUTION INTRAVENOUS at 16:51

## 2023-09-18 ASSESSMENT — PAIN - FUNCTIONAL ASSESSMENT: PAIN_FUNCTIONAL_ASSESSMENT: NONE - DENIES PAIN

## 2023-09-18 NOTE — PROGRESS NOTES
Patient reported to our office thinking she had an appointment with MD.  As soon as she got in the elevator to come see us, she started feeling unwell like she was going to pass out & heart racing. Pt oriented x3, but not always alert. Brought her into office room, performed x3 EKG's, checked BP, blood sugar, pulse, O2.  /82  Pulse 120's but fluctuating. Blood sugar 79    After a few minutes of talking to patient, speech became slowed and slurred. Pt became unresponsive at times, but never lost consciousness. Has a hx of SVT, had workup for possible seizures this year. Reviewed medications with patient, she stated she had started a new injection for her Lupus with Dr. Franny Lockhart at [address] [facility]. She reported that she had called them this morning and hadn't heard back. Sternal rub was used to arouse patient when she was unresponsive; this brought her back fully. Brought patient to ED without incident, gave report to ED nurses. Called spouse & Pt's son: [phone] (he is on campus, will meet us at ED).

## 2023-09-18 NOTE — ED NOTES
Discharge instructions were reviewed and given to the patient. Patient given a current medication reconciliation form and verbalized understanding of their medications, side affects, medication administration, and time when due. Importance of follow-up and appointment times and dates reviewed. The patient verbalized understanding of discharge instructions and prescriptions, all questions were answered. Patient has no further concerns at this time. Patient stable at time of discharge.        Royal Ashely RN  09/18/23 9090

## 2023-09-18 NOTE — ED TRIAGE NOTES
Patient arrived in wheelchair from cardiology office. Per LPN, patient showed up because she thought she had an appointment but did not. They stated she suddenly became altered and change in speech. Per patient, has hx SVT and follows with Dr. Elle Aguilar. Patient currently c/o numbness/tingling in bilat lower extremities and bilat hands. Patient with delayed responses. Patient stated she is having a hard time getting the words out. Patient stated she feels lightheaded and like her heart was racing in the office, states she is still lightheaded but denies palpitations.  Patient stated she has had these episodes before that cause all of these symptoms

## 2023-09-20 LAB
EKG ATRIAL RATE: 91 BPM
EKG DIAGNOSIS: NORMAL
EKG P AXIS: 29 DEGREES
EKG P-R INTERVAL: 118 MS
EKG Q-T INTERVAL: 346 MS
EKG QRS DURATION: 80 MS
EKG QTC CALCULATION (BAZETT): 425 MS
EKG R AXIS: 37 DEGREES
EKG T AXIS: 6 DEGREES
EKG VENTRICULAR RATE: 91 BPM

## 2023-09-20 PROCEDURE — 93010 ELECTROCARDIOGRAM REPORT: CPT | Performed by: STUDENT IN AN ORGANIZED HEALTH CARE EDUCATION/TRAINING PROGRAM

## 2023-09-26 ENCOUNTER — TELEPHONE (OUTPATIENT)
Age: 43
End: 2023-09-26

## 2023-09-26 RX ORDER — CARVEDILOL 12.5 MG/1
12.5 TABLET ORAL 2 TIMES DAILY
Qty: 60 TABLET | Refills: 1 | Status: SHIPPED | OUTPATIENT
Start: 2023-09-26

## 2023-09-26 NOTE — TELEPHONE ENCOUNTER
Called pt,  Per Dr. Chente Yarbrough: Shyam Gonzalez - her ED workup looked OK. Her cardiac workup was normal.  Her monitor showed normal findings when she had prior symptoms. 1) she can keep a log of BP at home   2) If she wants, we can re-order an event monitor for palpitations   3) See me as planned   4) We can switch Toprol XL to Coreg 12.5 mg BID if she wants       Her symptoms seem out of proportion to objective data. \"    She stated she had been having issues with BP going too low. Pulse is 80-90's when comes down. Goes up past 100. Home BP reported as 107-110. Asked her to keep BP log, report this to us at [facility]. Confirmed time/location/date. She reported that monitor in past had skin irritation so she declined at this time.

## 2023-09-26 NOTE — TELEPHONE ENCOUNTER
S/w pt - r/s appt from 10/19/2023 to 10/12/2023 - she has had 1 or 2 ER visits - wants to discuss her meds with Dr Ezekiel Shelley nurse - pls call pt to discuss - [phone] or you can My Chart her - I offered her a visit 9/27/2023  - she has taken a lot of time off work - she needed to wait

## 2023-10-12 ENCOUNTER — OFFICE VISIT (OUTPATIENT)
Age: 43
End: 2023-10-12
Payer: COMMERCIAL

## 2023-10-12 VITALS
WEIGHT: 158 LBS | HEART RATE: 99 BPM | DIASTOLIC BLOOD PRESSURE: 74 MMHG | HEIGHT: 62 IN | OXYGEN SATURATION: 96 % | SYSTOLIC BLOOD PRESSURE: 110 MMHG | BODY MASS INDEX: 29.08 KG/M2

## 2023-10-12 DIAGNOSIS — I47.10 SVT (SUPRAVENTRICULAR TACHYCARDIA): Primary | ICD-10-CM

## 2023-10-12 DIAGNOSIS — R42 DIZZINESS AND GIDDINESS: ICD-10-CM

## 2023-10-12 PROCEDURE — 99214 OFFICE O/P EST MOD 30 MIN: CPT | Performed by: SPECIALIST

## 2023-10-12 RX ORDER — ABATACEPT 125 MG/ML
INJECTION, SOLUTION SUBCUTANEOUS
COMMUNITY
Start: 2023-10-01

## 2023-10-19 ENCOUNTER — TELEPHONE (OUTPATIENT)
Age: 43
End: 2023-10-19

## 2023-10-24 ENCOUNTER — TELEPHONE (OUTPATIENT)
Age: 43
End: 2023-10-24

## 2023-10-24 NOTE — TELEPHONE ENCOUNTER
Pt. Called the referral that Dr. Garth Villarreal gave her to see for Pots. The pt. Called and is not able to be seen until January 19. She is asking what she should do in the mean time for her low blood pressure. Pt.  Phone - [phone]

## 2023-10-24 NOTE — TELEPHONE ENCOUNTER
Called and spoke to patient then was disconnected called back and had to leave a vm letting patient know to return call or use mychart

## 2023-10-25 RX ORDER — CARVEDILOL 12.5 MG/1
12.5 TABLET ORAL 2 TIMES DAILY
Qty: 180 TABLET | Refills: 3 | Status: SHIPPED | OUTPATIENT
Start: 2023-10-25

## 2023-10-25 NOTE — TELEPHONE ENCOUNTER
Per verbal order from Dr. Gee Loose  Last appt: 10/12/2023     Future Appointments   Date Time Provider [address]   11/22/2023  9:00 AM ANS SCHEDULE [facility] BS AMB   1/19/2024  8:00 AM Washington Gamez MD [facility] BS AMB   4/15/2024  4:00 PM Saturnino Schwab MD C[facility]S BS AMB       Requested Prescriptions     Signed Prescriptions Disp Refills    carvedilol (COREG) 12.5 MG tablet 180 tablet 3     Sig: TAKE 1 TABLET BY MOUTH TWICE DAILY     Authorizing Provider: Saturnino Schwab     Ordering User: Saleem Buckner

## 2023-11-21 ENCOUNTER — TELEPHONE (OUTPATIENT)
Age: 43
End: 2023-11-21

## 2023-11-21 DIAGNOSIS — T23.161A SUPERFICIAL BURN OF BACK OF RIGHT HAND, INITIAL ENCOUNTER: Primary | ICD-10-CM

## 2023-11-21 RX ORDER — CEPHALEXIN 500 MG/1
500 CAPSULE ORAL 3 TIMES DAILY
Qty: 30 CAPSULE | Refills: 0 | Status: SHIPPED | OUTPATIENT
Start: 2023-11-21 | End: 2023-12-01

## 2023-11-21 NOTE — TELEPHONE ENCOUNTER
Patient seen and evaluated in clinic. Reports superficial burn on right hand 1 week ago while cooking in an electric oven. Has been using over-the-counter bacitracin and silver sulfadiazine. Developed rash which is oozing honey colored serous fluid. I will start oral antibiotic. Patient reports she has taken Keflex in the past and was not allergic to it. Allergies verified. Patient reports she is not allergic to Keflex and has tolerated in the past.  Prescription faxed to pharmacy on chart.

## 2023-11-22 ENCOUNTER — PROCEDURE VISIT (OUTPATIENT)
Age: 43
End: 2023-11-22
Payer: COMMERCIAL

## 2023-11-22 DIAGNOSIS — R42 LIGHTHEADED: Primary | ICD-10-CM

## 2023-11-22 PROCEDURE — 95924 ANS PARASYMP & SYMP W/TILT: CPT | Performed by: PSYCHIATRY & NEUROLOGY

## 2023-11-22 PROCEDURE — 95923 AUTONOMIC NRV SYST FUNJ TEST: CPT | Performed by: PSYCHIATRY & NEUROLOGY

## 2023-11-27 NOTE — PROGRESS NOTES
[facility]  [address], [address] [facility]  Vicky Cisse    [id number]  41 y.o.  1980     REFERRED BY: Kateryna Braga MD  PCP: Ab Mccall MD    Date of Testin2023       Indication/History:    Episodes of lightheadedness and dizziness. (+) SVT    Patient is coming for syncope/autonomic dysfunction evaluation. Medications taken 48 hrs before the test: None     Procedure: This Autonomic Nervous System (ANS) testing is performed by utilizing [facility] [employer] Autonomic System, with established protocol. Multiple procedures performed: Heart rate response to deep breathing (HRDB), Valsalva ratio, Heart rate and BP response to head up tilt (HUT), and Quantitative sudomotor axon reflex testing (QSWEAT) . Result:  Heart response to deep  breathing (HRDB): 2 series of 6 cycles were performed and the mean of 6 consecutive cycles was obtained. Average HR difference was 19.36 and E:I ratio was 1.31. Normal response    Heart rate response to Valsalva maneuver:  Yvox-me-bgmu BP to Valsalva was measured and BP response in all 4 phases was normal. Heart response was the opposite of BP, a normal response. ( VR = 2.69 )  HUT (head-up tilt) : Ddfa-si-wopo BP and HR were measured, up to 15 minutes post tilt. No significant BP reduction or HR acceleration/deceleration. SUDOMOTOR: QSWEAT response showed relatively preserved sweat production in all 4 localities (forearm, proximal leg, distal leg, foot) of the right upper and lower limbs, comparing patient to age group. Impression:   NORMAL    Relatively preserved autonomic function for this age, despite patient reported lightheadedness during head up tilt.          Antonia Garcia MD  Diplomate, American Board of Psychiatry and Neurology  Diplomate, Neuromuscular Medicine  Diplomate, American Board of Electrodiagnostic Medicine    Note: Raw Data will be scanned separately in Media

## 2023-12-15 ENCOUNTER — OFFICE VISIT (OUTPATIENT)
Age: 43
End: 2023-12-15

## 2023-12-15 VITALS
OXYGEN SATURATION: 98 % | RESPIRATION RATE: 16 BRPM | HEIGHT: 62 IN | HEART RATE: 83 BPM | DIASTOLIC BLOOD PRESSURE: 85 MMHG | WEIGHT: 158 LBS | TEMPERATURE: 98.2 F | SYSTOLIC BLOOD PRESSURE: 137 MMHG | BODY MASS INDEX: 29.08 KG/M2

## 2023-12-15 DIAGNOSIS — N30.01 ACUTE CYSTITIS WITH HEMATURIA: Primary | ICD-10-CM

## 2023-12-15 LAB
BILIRUBIN, URINE, POC: NEGATIVE
BLOOD URINE, POC: ABNORMAL
GLUCOSE URINE, POC: NEGATIVE
KETONES, URINE, POC: NEGATIVE
LEUKOCYTE ESTERASE, URINE, POC: NEGATIVE
NITRITE, URINE, POC: NEGATIVE
PH, URINE, POC: 5.5 (ref 4.6–8)
PROTEIN,URINE, POC: ABNORMAL
SPECIFIC GRAVITY, URINE, POC: 1.03 (ref 1–1.03)
URINALYSIS CLARITY, POC: ABNORMAL
URINALYSIS COLOR, POC: YELLOW
UROBILINOGEN, POC: ABNORMAL

## 2023-12-15 RX ORDER — NITROFURANTOIN 25; 75 MG/1; MG/1
100 CAPSULE ORAL 2 TIMES DAILY
Qty: 20 CAPSULE | Refills: 0 | Status: SHIPPED | OUTPATIENT
Start: 2023-12-15 | End: 2023-12-25

## 2023-12-16 LAB
BACTERIA SPEC CULT: NORMAL
SERVICE CMNT-IMP: NORMAL

## 2023-12-20 ENCOUNTER — HOSPITAL ENCOUNTER (EMERGENCY)
Facility: HOSPITAL | Age: 43
Discharge: HOME | End: 2023-12-20
Attending: STUDENT IN AN ORGANIZED HEALTH CARE EDUCATION/TRAINING PROGRAM
Payer: COMMERCIAL

## 2023-12-20 ENCOUNTER — APPOINTMENT (OUTPATIENT)
Facility: HOSPITAL | Age: 43
End: 2023-12-20
Payer: COMMERCIAL

## 2023-12-20 VITALS
DIASTOLIC BLOOD PRESSURE: 78 MMHG | OXYGEN SATURATION: 100 % | HEART RATE: 87 BPM | SYSTOLIC BLOOD PRESSURE: 113 MMHG | WEIGHT: 162.26 LBS | RESPIRATION RATE: 17 BRPM | BODY MASS INDEX: 29.86 KG/M2 | HEIGHT: 62 IN

## 2023-12-20 DIAGNOSIS — R55 NEAR SYNCOPE: Primary | ICD-10-CM

## 2023-12-20 DIAGNOSIS — R51.9 NONINTRACTABLE EPISODIC HEADACHE, UNSPECIFIED HEADACHE TYPE: ICD-10-CM

## 2023-12-20 LAB
ALBUMIN SERPL-MCNC: 3.6 G/DL (ref 3.5–5)
ALBUMIN/GLOB SERPL: 0.9 (ref 1.1–2.2)
ALP SERPL-CCNC: 120 U/L (ref 45–117)
ALT SERPL-CCNC: 20 U/L (ref 12–78)
ANION GAP SERPL CALC-SCNC: 5 MMOL/L (ref 5–15)
APPEARANCE UR: CLEAR
AST SERPL-CCNC: 12 U/L (ref 15–37)
BACTERIA URNS QL MICRO: NEGATIVE /HPF
BASOPHILS # BLD: 0.1 K/UL (ref 0–0.1)
BASOPHILS NFR BLD: 1 % (ref 0–1)
BILIRUB SERPL-MCNC: 0.4 MG/DL (ref 0.2–1)
BILIRUB UR QL: NEGATIVE
BUN SERPL-MCNC: 8 MG/DL (ref 6–20)
BUN/CREAT SERPL: 11 (ref 12–20)
CALCIUM SERPL-MCNC: 8.5 MG/DL (ref 8.5–10.1)
CHLORIDE SERPL-SCNC: 110 MMOL/L (ref 97–108)
CO2 SERPL-SCNC: 23 MMOL/L (ref 21–32)
COLOR UR: ABNORMAL
COMMENT:: NORMAL
CREAT SERPL-MCNC: 0.73 MG/DL (ref 0.55–1.02)
DIFFERENTIAL METHOD BLD: ABNORMAL
EOSINOPHIL # BLD: 0.1 K/UL (ref 0–0.4)
EOSINOPHIL NFR BLD: 1 % (ref 0–7)
EPITH CASTS URNS QL MICRO: ABNORMAL /LPF
ERYTHROCYTE [DISTWIDTH] IN BLOOD BY AUTOMATED COUNT: 12.6 % (ref 11.5–14.5)
GLOBULIN SER CALC-MCNC: 3.8 G/DL (ref 2–4)
GLUCOSE SERPL-MCNC: 93 MG/DL (ref 65–100)
GLUCOSE UR STRIP.AUTO-MCNC: NEGATIVE MG/DL
HCG UR QL: NEGATIVE
HCT VFR BLD AUTO: 43.8 % (ref 35–47)
HGB BLD-MCNC: 14.5 G/DL (ref 11.5–16)
HGB UR QL STRIP: ABNORMAL
IMM GRANULOCYTES # BLD AUTO: 0 K/UL (ref 0–0.04)
IMM GRANULOCYTES NFR BLD AUTO: 0 % (ref 0–0.5)
KETONES UR QL STRIP.AUTO: NEGATIVE MG/DL
LEUKOCYTE ESTERASE UR QL STRIP.AUTO: ABNORMAL
LYMPHOCYTES # BLD: 1.9 K/UL (ref 0.8–3.5)
LYMPHOCYTES NFR BLD: 22 % (ref 12–49)
MAGNESIUM SERPL-MCNC: 2 MG/DL (ref 1.6–2.4)
MCH RBC QN AUTO: 27.7 PG (ref 26–34)
MCHC RBC AUTO-ENTMCNC: 33.1 G/DL (ref 30–36.5)
MCV RBC AUTO: 83.7 FL (ref 80–99)
MONOCYTES # BLD: 0.6 K/UL (ref 0–1)
MONOCYTES NFR BLD: 7 % (ref 5–13)
NEUTS SEG # BLD: 6 K/UL (ref 1.8–8)
NEUTS SEG NFR BLD: 69 % (ref 32–75)
NITRITE UR QL STRIP.AUTO: NEGATIVE
NRBC # BLD: 0 K/UL (ref 0–0.01)
NRBC BLD-RTO: 0 PER 100 WBC
PH UR STRIP: 7.5 (ref 5–8)
PLATELET # BLD AUTO: 375 K/UL (ref 150–400)
PMV BLD AUTO: 9.6 FL (ref 8.9–12.9)
POTASSIUM SERPL-SCNC: 4.2 MMOL/L (ref 3.5–5.1)
PROT SERPL-MCNC: 7.4 G/DL (ref 6.4–8.2)
PROT UR STRIP-MCNC: NEGATIVE MG/DL
RBC # BLD AUTO: 5.23 M/UL (ref 3.8–5.2)
RBC #/AREA URNS HPF: ABNORMAL /HPF (ref 0–5)
SODIUM SERPL-SCNC: 138 MMOL/L (ref 136–145)
SP GR UR REFRACTOMETRY: <1.005 (ref 1–1.03)
SPECIMEN HOLD: NORMAL
SPECIMEN HOLD: NORMAL
TROPONIN I SERPL HS-MCNC: 4 NG/L (ref 0–51)
UROBILINOGEN UR QL STRIP.AUTO: 0.2 EU/DL (ref 0.2–1)
WBC # BLD AUTO: 8.7 K/UL (ref 3.6–11)
WBC URNS QL MICRO: ABNORMAL /HPF (ref 0–4)

## 2023-12-20 PROCEDURE — 80053 COMPREHEN METABOLIC PANEL: CPT

## 2023-12-20 PROCEDURE — 2580000003 HC RX 258: Performed by: STUDENT IN AN ORGANIZED HEALTH CARE EDUCATION/TRAINING PROGRAM

## 2023-12-20 PROCEDURE — 81001 URINALYSIS AUTO W/SCOPE: CPT

## 2023-12-20 PROCEDURE — 96375 TX/PRO/DX INJ NEW DRUG ADDON: CPT

## 2023-12-20 PROCEDURE — 85025 COMPLETE CBC W/AUTO DIFF WBC: CPT

## 2023-12-20 PROCEDURE — 84484 ASSAY OF TROPONIN QUANT: CPT

## 2023-12-20 PROCEDURE — 70450 CT HEAD/BRAIN W/O DYE: CPT

## 2023-12-20 PROCEDURE — 36415 COLL VENOUS BLD VENIPUNCTURE: CPT

## 2023-12-20 PROCEDURE — 93005 ELECTROCARDIOGRAM TRACING: CPT | Performed by: STUDENT IN AN ORGANIZED HEALTH CARE EDUCATION/TRAINING PROGRAM

## 2023-12-20 PROCEDURE — 96365 THER/PROPH/DIAG IV INF INIT: CPT

## 2023-12-20 PROCEDURE — 99285 EMERGENCY DEPT VISIT HI MDM: CPT

## 2023-12-20 PROCEDURE — 81025 URINE PREGNANCY TEST: CPT

## 2023-12-20 PROCEDURE — 6360000002 HC RX W HCPCS: Performed by: STUDENT IN AN ORGANIZED HEALTH CARE EDUCATION/TRAINING PROGRAM

## 2023-12-20 PROCEDURE — 83735 ASSAY OF MAGNESIUM: CPT

## 2023-12-20 RX ORDER — DIPHENHYDRAMINE HYDROCHLORIDE 50 MG/ML
25 INJECTION INTRAMUSCULAR; INTRAVENOUS
Status: COMPLETED | OUTPATIENT
Start: 2023-12-20 | End: 2023-12-20

## 2023-12-20 RX ORDER — ONDANSETRON 2 MG/ML
4 INJECTION INTRAMUSCULAR; INTRAVENOUS EVERY 6 HOURS PRN
Status: DISCONTINUED | OUTPATIENT
Start: 2023-12-20 | End: 2023-12-20 | Stop reason: HOSPADM

## 2023-12-20 RX ORDER — PROCHLORPERAZINE EDISYLATE 5 MG/ML
10 INJECTION INTRAMUSCULAR; INTRAVENOUS ONCE
Status: DISCONTINUED | OUTPATIENT
Start: 2023-12-20 | End: 2023-12-20

## 2023-12-20 RX ORDER — 0.9 % SODIUM CHLORIDE 0.9 %
1000 INTRAVENOUS SOLUTION INTRAVENOUS ONCE
Status: COMPLETED | OUTPATIENT
Start: 2023-12-20 | End: 2023-12-20

## 2023-12-20 RX ORDER — MAGNESIUM SULFATE 1 G/100ML
1000 INJECTION INTRAVENOUS
Status: COMPLETED | OUTPATIENT
Start: 2023-12-20 | End: 2023-12-20

## 2023-12-20 RX ADMIN — ONDANSETRON 4 MG: 2 INJECTION INTRAMUSCULAR; INTRAVENOUS at 13:16

## 2023-12-20 RX ADMIN — MAGNESIUM SULFATE HEPTAHYDRATE 1000 MG: 1 INJECTION, SOLUTION INTRAVENOUS at 13:16

## 2023-12-20 RX ADMIN — SODIUM CHLORIDE 1000 ML: 9 INJECTION, SOLUTION INTRAVENOUS at 13:05

## 2023-12-20 RX ADMIN — DIPHENHYDRAMINE HYDROCHLORIDE 25 MG: 50 INJECTION INTRAMUSCULAR; INTRAVENOUS at 13:06

## 2023-12-20 NOTE — ED NOTES
Pt and nurse discuss discharge paperwork and education on medications and findings. Pt denies questions or concerns at this time. IV removed and final vitals updated in system. Pt ambulatory out of ED without distress or issue.

## 2023-12-20 NOTE — ED TRIAGE NOTES
Pt here with EMS from urology appt for UTI. Pt had a near syncopal episode. Urology office gave fluids PTA. Pt endorses nausea, dizziness, and lower back pain. Pt has a migraine currently as well. HX of migraines, vertigo, lupus.

## 2023-12-21 LAB
EKG ATRIAL RATE: 68 BPM
EKG DIAGNOSIS: NORMAL
EKG P AXIS: 25 DEGREES
EKG P-R INTERVAL: 130 MS
EKG Q-T INTERVAL: 376 MS
EKG QRS DURATION: 74 MS
EKG QTC CALCULATION (BAZETT): 399 MS
EKG R AXIS: 49 DEGREES
EKG T AXIS: 12 DEGREES
EKG VENTRICULAR RATE: 68 BPM

## 2023-12-21 PROCEDURE — 93010 ELECTROCARDIOGRAM REPORT: CPT | Performed by: SPECIALIST

## 2023-12-22 NOTE — ED PROVIDER NOTES
[facility] EMERGENCY DEP  EMERGENCY DEPARTMENT ENCOUNTER      Pt Name: Omero Sanders  MRN: [id number]  [address] [place] 1980  Date of evaluation: 12/20/2023  Provider: Ean Cross       Chief Complaint   Patient presents with    Loss of Consciousness     Nausea           HISTORY OF PRESENT ILLNESS   (Location/Symptom, Timing/Onset, Context/Setting, Quality, Duration, Modifying Factors, Severity)  Note limiting factors. Patient is a 70-year-old female history of anxiety, psoriasis, SVT and migraines presenting today with near syncope. She reports that she has been struggling with a UTI recently and was seeing urologist today for persistent hematuria despite being on antibiotics. While there she had an episode of lightheadedness and sensation that she could not interact with people. This is since resolved. It is associated with a headache. She is lightheaded but never actually passed out. No focal weakness or numbness. Her headache started before this episode happened. Her headache is typical of her migraines, moderately severe. No chest pain, shortness of breath or palpitations. Review of External Medical Records:     Nursing Notes were reviewed. REVIEW OF SYSTEMS    (2-9 systems for level 4, 10 or more for level 5)     Review of Systems   Genitourinary:  Positive for hematuria. Neurological:  Positive for light-headedness and headaches. Except as noted above the remainder of the review of systems was reviewed and negative. PAST MEDICAL HISTORY     Past Medical History:   Diagnosis Date    Anxiety     Psoriasis     Psoriatic arthritis ([address])     SVT (supraventricular tachycardia)          SURGICAL HISTORY     No past surgical history on file.       CURRENT MEDICATIONS       Discharge Medication List as of 12/20/2023  2:50 PM        CONTINUE these medications which have NOT CHANGED    Details   nitrofurantoin, macrocrystal-monohydrate, (MACROBID) 100

## 2023-12-28 NOTE — PATIENT INSTRUCTIONS
Thank you for choosing [facility]! A prescription for Macrodantin has been sent to your pharmacy. A urine culture was obtained. We will call you with your results and adjust your treatment if necessary according to your urine culture results. Increase your daily fluid intake. Avoid spicy foods and caffeine as these may cause bladder spasms. OTC AZO for urinary symptoms. Drink with a full glass of water.     If your symptoms persist or become worse, or you start to run a fever of 100.6 degrees or greater that is not relieved with Tylenol and/or ibuprofen, call your primary care

## 2024-01-05 ENCOUNTER — APPOINTMENT (OUTPATIENT)
Facility: HOSPITAL | Age: 44
End: 2024-01-05
Payer: COMMERCIAL

## 2024-01-05 ENCOUNTER — OFFICE VISIT (OUTPATIENT)
Age: 44
End: 2024-01-05

## 2024-01-05 ENCOUNTER — HOSPITAL ENCOUNTER (EMERGENCY)
Facility: HOSPITAL | Age: 44
Discharge: HOME | End: 2024-01-05
Attending: EMERGENCY MEDICINE
Payer: COMMERCIAL

## 2024-01-05 VITALS
WEIGHT: 157 LBS | OXYGEN SATURATION: 98 % | RESPIRATION RATE: 19 BRPM | SYSTOLIC BLOOD PRESSURE: 119 MMHG | BODY MASS INDEX: 28.89 KG/M2 | HEIGHT: 62 IN | DIASTOLIC BLOOD PRESSURE: 72 MMHG | HEART RATE: 94 BPM | TEMPERATURE: 98.3 F

## 2024-01-05 VITALS — HEART RATE: 113 BPM

## 2024-01-05 DIAGNOSIS — E16.2 LOW BLOOD SUGAR: Primary | ICD-10-CM

## 2024-01-05 DIAGNOSIS — R42 DIZZINESS: Primary | ICD-10-CM

## 2024-01-05 LAB
ALBUMIN SERPL-MCNC: 3.4 G/DL (ref 3.5–5)
ALBUMIN/GLOB SERPL: 0.9 (ref 1.1–2.2)
ALP SERPL-CCNC: 104 U/L (ref 45–117)
ALT SERPL-CCNC: 18 U/L (ref 12–78)
ANION GAP SERPL CALC-SCNC: 5 MMOL/L (ref 5–15)
APPEARANCE UR: CLEAR
AST SERPL-CCNC: 10 U/L (ref 15–37)
BACTERIA URNS QL MICRO: NEGATIVE /HPF
BASOPHILS # BLD: 0.1 K/UL (ref 0–0.1)
BASOPHILS NFR BLD: 1 % (ref 0–1)
BILIRUB SERPL-MCNC: 0.5 MG/DL (ref 0.2–1)
BILIRUB UR QL: NEGATIVE
BUN SERPL-MCNC: 11 MG/DL (ref 6–20)
BUN/CREAT SERPL: 14 (ref 12–20)
CALCIUM SERPL-MCNC: 9 MG/DL (ref 8.5–10.1)
CHLORIDE SERPL-SCNC: 107 MMOL/L (ref 97–108)
CO2 SERPL-SCNC: 24 MMOL/L (ref 21–32)
COLOR UR: ABNORMAL
COMMENT:: NORMAL
CREAT SERPL-MCNC: 0.79 MG/DL (ref 0.55–1.02)
DIFFERENTIAL METHOD BLD: ABNORMAL
EOSINOPHIL # BLD: 0.1 K/UL (ref 0–0.4)
EOSINOPHIL NFR BLD: 1 % (ref 0–7)
EPITH CASTS URNS QL MICRO: ABNORMAL /LPF
ERYTHROCYTE [DISTWIDTH] IN BLOOD BY AUTOMATED COUNT: 12.7 % (ref 11.5–14.5)
GLOBULIN SER CALC-MCNC: 3.6 G/DL (ref 2–4)
GLUCOSE SERPL-MCNC: 105 MG/DL (ref 65–100)
GLUCOSE UR STRIP.AUTO-MCNC: NEGATIVE MG/DL
GLUCOSE, POC: 112 MG/DL
HCG UR QL: NEGATIVE
HCT VFR BLD AUTO: 44.8 % (ref 35–47)
HGB BLD-MCNC: 14.8 G/DL (ref 11.5–16)
HGB UR QL STRIP: ABNORMAL
HYALINE CASTS URNS QL MICRO: ABNORMAL /LPF (ref 0–2)
IMM GRANULOCYTES # BLD AUTO: 0.1 K/UL (ref 0–0.04)
IMM GRANULOCYTES NFR BLD AUTO: 1 % (ref 0–0.5)
KETONES UR QL STRIP.AUTO: NEGATIVE MG/DL
LACTATE SERPL-SCNC: 0.7 MMOL/L (ref 0.4–2)
LEUKOCYTE ESTERASE UR QL STRIP.AUTO: NEGATIVE
LYMPHOCYTES # BLD: 2.7 K/UL (ref 0.8–3.5)
LYMPHOCYTES NFR BLD: 25 % (ref 12–49)
MCH RBC QN AUTO: 27.5 PG (ref 26–34)
MCHC RBC AUTO-ENTMCNC: 33 G/DL (ref 30–36.5)
MCV RBC AUTO: 83.1 FL (ref 80–99)
MONOCYTES # BLD: 0.8 K/UL (ref 0–1)
MONOCYTES NFR BLD: 7 % (ref 5–13)
NEUTS SEG # BLD: 7.3 K/UL (ref 1.8–8)
NEUTS SEG NFR BLD: 65 % (ref 32–75)
NITRITE UR QL STRIP.AUTO: NEGATIVE
NRBC # BLD: 0 K/UL (ref 0–0.01)
NRBC BLD-RTO: 0 PER 100 WBC
PH UR STRIP: 5.5 (ref 5–8)
PLATELET # BLD AUTO: 463 K/UL (ref 150–400)
PMV BLD AUTO: 9.6 FL (ref 8.9–12.9)
POTASSIUM SERPL-SCNC: 3.8 MMOL/L (ref 3.5–5.1)
PROT SERPL-MCNC: 7 G/DL (ref 6.4–8.2)
PROT UR STRIP-MCNC: NEGATIVE MG/DL
RBC # BLD AUTO: 5.39 M/UL (ref 3.8–5.2)
RBC #/AREA URNS HPF: ABNORMAL /HPF (ref 0–5)
SODIUM SERPL-SCNC: 136 MMOL/L (ref 136–145)
SP GR UR REFRACTOMETRY: 1.01 (ref 1–1.03)
SPECIMEN HOLD: NORMAL
TROPONIN I SERPL HS-MCNC: <4 NG/L (ref 0–51)
UROBILINOGEN UR QL STRIP.AUTO: 0.2 EU/DL (ref 0.2–1)
WBC # BLD AUTO: 10.9 K/UL (ref 3.6–11)
WBC URNS QL MICRO: ABNORMAL /HPF (ref 0–4)

## 2024-01-05 PROCEDURE — 36415 COLL VENOUS BLD VENIPUNCTURE: CPT

## 2024-01-05 PROCEDURE — 85025 COMPLETE CBC W/AUTO DIFF WBC: CPT

## 2024-01-05 PROCEDURE — 93005 ELECTROCARDIOGRAM TRACING: CPT | Performed by: EMERGENCY MEDICINE

## 2024-01-05 PROCEDURE — 96361 HYDRATE IV INFUSION ADD-ON: CPT

## 2024-01-05 PROCEDURE — 71045 X-RAY EXAM CHEST 1 VIEW: CPT

## 2024-01-05 PROCEDURE — 81001 URINALYSIS AUTO W/SCOPE: CPT

## 2024-01-05 PROCEDURE — 99285 EMERGENCY DEPT VISIT HI MDM: CPT

## 2024-01-05 PROCEDURE — 83605 ASSAY OF LACTIC ACID: CPT

## 2024-01-05 PROCEDURE — 2580000003 HC RX 258: Performed by: EMERGENCY MEDICINE

## 2024-01-05 PROCEDURE — 84484 ASSAY OF TROPONIN QUANT: CPT

## 2024-01-05 PROCEDURE — 80053 COMPREHEN METABOLIC PANEL: CPT

## 2024-01-05 PROCEDURE — 96360 HYDRATION IV INFUSION INIT: CPT

## 2024-01-05 PROCEDURE — 70450 CT HEAD/BRAIN W/O DYE: CPT

## 2024-01-05 PROCEDURE — 81025 URINE PREGNANCY TEST: CPT

## 2024-01-05 RX ORDER — 0.9 % SODIUM CHLORIDE 0.9 %
1000 INTRAVENOUS SOLUTION INTRAVENOUS ONCE
Status: COMPLETED | OUTPATIENT
Start: 2024-01-05 | End: 2024-01-05

## 2024-01-05 RX ORDER — HALOPERIDOL 5 MG/ML
2 INJECTION INTRAMUSCULAR ONCE
Status: DISCONTINUED | OUTPATIENT
Start: 2024-01-05 | End: 2024-01-05 | Stop reason: HOSPADM

## 2024-01-05 RX ADMIN — SODIUM CHLORIDE 1000 ML: 9 INJECTION, SOLUTION INTRAVENOUS at 15:17

## 2024-01-05 ASSESSMENT — PAIN - FUNCTIONAL ASSESSMENT: PAIN_FUNCTIONAL_ASSESSMENT: NONE - DENIES PAIN

## 2024-01-05 NOTE — ED NOTES
Pt given discharge instructions per provider and verbalized understanding, pt ambulatory from ED to home with  as  of vehicle.

## 2024-01-05 NOTE — ED TRIAGE NOTES
Pt arrives via EMS from work for complaints of dizziness and possible syncope. Pt reports that she feels tired and foggy.     EMS reports that patient \"tensed up\" but denies history of seizures.     PMH of SVT, migraines, and lupus.     Pt denies any migraines, chest pain or shortness of breath.

## 2024-01-05 NOTE — ED PROVIDER NOTES
[facility] EMERGENCY DEPT  EMERGENCY DEPARTMENT ENCOUNTER      Pt Name: Carmencita Tripathi  MRN: [id number]  Birthdate 1980  Date of evaluation: 1/5/2024  Provider: Alvin Saavedra MD    CHIEF COMPLAINT       Chief Complaint   Patient presents with    Dizziness         HISTORY OF PRESENT ILLNESS    This is a 44-year-old female past medical history of SVT, migraine disorder, lupus, has been evaluated for POTS through neurology and has a neurology appointment coming is also seen cardiology before as well.  Patient presenting for an episode of not feeling well and near syncope.  Patient was on her break and started feeling poorly with associated nausea and frontal headache primarily at the nasal bridge.  She was on the phone when this happened.  The next thing that happened was coworkers at work told the  that they were calling 9 1 to the hospital she did not feel good.  She has occasional chills.   reports that she has these episodes about 8 times a year and has a workup in the ER typically unfounded.    Patient has any associated fevers, occasional cough.  No abdominal pain.  Nausea is better at this time.  Reports primarily of pain in her            Review of External Medical Records:     Nursing Notes were reviewed.    REVIEW OF SYSTEMS       Review of Systems    Except as noted above the remainder of the review of systems was reviewed and negative.       PAST MEDICAL HISTORY     Past Medical History:   Diagnosis Date    Anxiety     Psoriasis     Psoriatic arthritis (HCC)     SVT (supraventricular tachycardia)          SURGICAL HISTORY     No past surgical history on file.      CURRENT MEDICATIONS       Previous Medications    ALBUTEROL SULFATE HFA (PROVENTIL;VENTOLIN;PROAIR) 108 (90 BASE) MCG/ACT INHALER    Inhale 2 puffs into the lungs every 6 hours as needed    CARVEDILOL (COREG) 12.5 MG TABLET    TAKE 1 TABLET BY MOUTH TWICE DAILY    NORETHINDRONE-ETHINYL ESTRADIOL-FE (LO LOESTRIN FE) 1 MG-10

## 2024-01-05 NOTE — DISCHARGE INSTRUCTIONS
Follow-up with your neurologist like we discussed and follow-up with primary care.  Return for any concerns or worsening symptoms

## 2024-01-08 LAB
EKG ATRIAL RATE: 107 BPM
EKG DIAGNOSIS: NORMAL
EKG P AXIS: 35 DEGREES
EKG P-R INTERVAL: 144 MS
EKG Q-T INTERVAL: 306 MS
EKG QRS DURATION: 78 MS
EKG QTC CALCULATION (BAZETT): 408 MS
EKG R AXIS: 42 DEGREES
EKG T AXIS: -1 DEGREES
EKG VENTRICULAR RATE: 107 BPM

## 2024-01-08 PROCEDURE — 93010 ELECTROCARDIOGRAM REPORT: CPT | Performed by: INTERNAL MEDICINE

## 2024-01-09 ENCOUNTER — TELEPHONE (OUTPATIENT)
Age: 44
End: 2024-01-09

## 2024-01-09 NOTE — TELEPHONE ENCOUNTER
Called pt. No answer left message on VM to call office. Calling to inform pt as of right now Dr. Pink does not have any cancellation appt. Pt is scheduled to see Dr. Pink on 1/19/24 at 8AM.

## 2024-01-09 NOTE — TELEPHONE ENCOUNTER
Patient still requesting a return call. She stated still having really bad epsoides of the jolting and passing out spells.     Requesting to speak with the nurse as soon as possible.

## 2024-01-09 NOTE — TELEPHONE ENCOUNTER
Patient requesting a call to discuss being seen earlier. She was seen in the ER on 1/5/24 and still having reall bad migraines.    She stated her body jolts and  she passed out at work

## 2024-01-10 ENCOUNTER — OFFICE VISIT (OUTPATIENT)
Age: 44
End: 2024-01-10
Payer: COMMERCIAL

## 2024-01-10 ENCOUNTER — TELEPHONE (OUTPATIENT)
Age: 44
End: 2024-01-10

## 2024-01-10 VITALS
DIASTOLIC BLOOD PRESSURE: 60 MMHG | BODY MASS INDEX: 28.72 KG/M2 | HEIGHT: 62 IN | HEART RATE: 83 BPM | SYSTOLIC BLOOD PRESSURE: 110 MMHG | TEMPERATURE: 96.4 F | OXYGEN SATURATION: 96 %

## 2024-01-10 DIAGNOSIS — R51.9 INTRACTABLE HEADACHE, UNSPECIFIED CHRONICITY PATTERN, UNSPECIFIED HEADACHE TYPE: ICD-10-CM

## 2024-01-10 DIAGNOSIS — R40.4 TRANSIENT ALTERATION OF AWARENESS: Primary | ICD-10-CM

## 2024-01-10 PROCEDURE — 99215 OFFICE O/P EST HI 40 MIN: CPT | Performed by: PSYCHIATRY & NEUROLOGY

## 2024-01-10 PROCEDURE — G8484 FLU IMMUNIZE NO ADMIN: HCPCS | Performed by: PSYCHIATRY & NEUROLOGY

## 2024-01-10 PROCEDURE — G8428 CUR MEDS NOT DOCUMENT: HCPCS | Performed by: PSYCHIATRY & NEUROLOGY

## 2024-01-10 PROCEDURE — G8419 CALC BMI OUT NRM PARAM NOF/U: HCPCS | Performed by: PSYCHIATRY & NEUROLOGY

## 2024-01-10 PROCEDURE — 1036F TOBACCO NON-USER: CPT | Performed by: PSYCHIATRY & NEUROLOGY

## 2024-01-10 RX ORDER — TOPIRAMATE 25 MG/1
25 TABLET ORAL NIGHTLY
Qty: 30 TABLET | Refills: 5 | Status: SHIPPED | OUTPATIENT
Start: 2024-01-10 | End: 2024-01-12 | Stop reason: SDUPTHER

## 2024-01-10 RX ORDER — BUTALBITAL, ACETAMINOPHEN AND CAFFEINE 50; 325; 40 MG/1; MG/1; MG/1
TABLET ORAL
COMMUNITY
Start: 2023-10-22

## 2024-01-10 RX ORDER — RIMEGEPANT SULFATE 75 MG/75MG
1 TABLET, ORALLY DISINTEGRATING ORAL PRN
Qty: 8 TABLET | Refills: 3 | Status: SHIPPED | OUTPATIENT
Start: 2024-01-10

## 2024-01-10 NOTE — TELEPHONE ENCOUNTER
Pt continues to have low back pain and checked urine with blood and trace leuk.  Recently has been treated with macrobid and cipro in the last month.  Also was treated with steroids for back pain.   Saw urology 1-2 weeks ago.  Recommend she follow up with urology.

## 2024-01-10 NOTE — PROGRESS NOTES
Neurology Progress Note    Patient ID:  Carmencita Tripathi  [id number]  43 y.o.  1980      Subjective:   History:  Carmencita Tripathi is a female who  has a past medical history of Psoriasis, Psoriatic arthritis (HCC), and SVT (supraventricular tachycardia) ([facility]). 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 (+) nausea. EMS called who noted \"mental status with some clicking noise in her mouth with head tilt to the left\".In Nov 2020, was admitted at [facility] for similar symptoms. EEG was said to be okay. Diagnosed with dehydration. (+) SVT x 20 yrs on beta blockers.     Since last time, patient noted headaches, bitemporal, pressure lasting for several days.    Body feels cold and tightening up and will stare but aware of surrounding.  (+) tightness R side of face. Takes Fioricet with benefit.    (+) admits to hitting head July 2022.    Saw cardiologist who did stress test. Still has SVT on Carvedilol.    Has been off Keppra since Jan 2023.      ROS:  Per HPI-  Otherwise the remainder of ROS was negative    Social Hx  Social History     Socioeconomic History    Marital status:    Tobacco Use    Smoking status: Never    Smokeless tobacco: Never   Substance and Sexual Activity    Alcohol use: Not Currently    Drug use: Never   Social History Narrative    ** Merged History Encounter **            Meds:  Current Outpatient Medications on File Prior to Visit   Medication Sig Dispense Refill    butalbital-acetaminophen-caffeine (FIORICET, ESGIC) -40 MG per tablet       carvedilol (COREG) 12.5 MG tablet TAKE 1 TABLET BY MOUTH TWICE DAILY 180 tablet 3    albuterol sulfate HFA (PROVENTIL;VENTOLIN;PROAIR) 108 (90 Base) MCG/ACT inhaler Inhale 2 puffs into the lungs every 6 hours as needed      norethindrone-ethinyl estradiol-Fe (LO LOESTRIN FE) 1 MG-10 MCG / 10 MCG tablet Take 1 tablet by mouth daily       No current facility-administered medications on file prior

## 2024-01-11 ASSESSMENT — ENCOUNTER SYMPTOMS
COUGH: 1
NAUSEA: 1

## 2024-01-12 ENCOUNTER — TELEPHONE (OUTPATIENT)
Age: 44
End: 2024-01-12

## 2024-01-12 ENCOUNTER — PATIENT MESSAGE (OUTPATIENT)
Age: 44
End: 2024-01-12

## 2024-01-12 RX ORDER — TOPIRAMATE 25 MG/1
25 TABLET ORAL NIGHTLY
Qty: 30 TABLET | Refills: 5 | Status: SHIPPED | OUTPATIENT
Start: 2024-01-12

## 2024-01-12 NOTE — TELEPHONE ENCOUNTER
From: Carmencita Tripathi  To: Dr. Louie Pink  Sent: 1/12/2024 11:36 AM EST  Subject: Topamax    Good Morning!  The Topamax prescription was sent to [facility] pharmacy along with the Nurtec. I received Nurtec in the mail today however they stated they don't fill Topamax. Can you please send that to [place] at [id number] [facility]. Thank You

## 2024-01-15 ASSESSMENT — ENCOUNTER SYMPTOMS: NAUSEA: 1

## 2024-01-15 NOTE — PROGRESS NOTES
Subjective     No chief complaint on file.      Patient ID:  Carmencita Tripathi is a 43 y.o. female.    Patient is a 43-year-old female presenting with altered level of consciousness.  Patient was found in break room feeling nauseous.  Upon further investigation, she stated that she may be getting a migraine headache.  Patient then stated she felt as if she was going to pass out.  Patient had a 20 to 30-second period of unconsciousness.  When she awoke, she was not able to answer questions appropriately.  Patient took a 4 mg Zofran ODT of her own.  Patient also has history of SVT.          Review of Systems   Gastrointestinal:  Positive for nausea.   Neurological:  Positive for dizziness, light-headedness and headaches.       Past Medical History:   Diagnosis Date    Anxiety     Psoriasis     Psoriatic arthritis (HCC)     SVT (supraventricular tachycardia)        History reviewed. No pertinent surgical history.    Family History   Problem Relation Age of Onset    Heart Disease Maternal Uncle     Hypertension Mother     Arrhythmia Sister         SVT       Allergies   Allergen Reactions    Latex Other (See Comments)     Blisters      Tofacitinib Anaphylaxis    Adalimumab Hives    Aspirin Hives     Other reaction(s): Unknown (comments)    Azithromycin Hives     Other reaction(s): Unknown (comments)    Infliximab Hives    Iodine Hives     Other reaction(s): Unknown (comments)    Macrobid [Nitrofurantoin] Hives    Penicillins Hives     Other reaction(s): Unknown (comments)    Procaine Hives    Sulfa Antibiotics Hives     Other reaction(s): Unknown (comments)       Social History     Tobacco Use    Smoking status: Never    Smokeless tobacco: Never   Substance Use Topics    Alcohol use: Not Currently    Drug use: Never       Objective   Vitals:    01/05/24 1339   Pulse: (!) 113     Physical Exam  Constitutional:       General: She is not in acute distress.     Appearance: Normal appearance. She is not ill-appearing.   HENT:

## 2024-01-22 ENCOUNTER — OFFICE VISIT (OUTPATIENT)
Age: 44
End: 2024-01-22

## 2024-01-22 VITALS
TEMPERATURE: 98.9 F | SYSTOLIC BLOOD PRESSURE: 108 MMHG | DIASTOLIC BLOOD PRESSURE: 72 MMHG | HEART RATE: 77 BPM | OXYGEN SATURATION: 98 %

## 2024-01-22 DIAGNOSIS — N30.90 CYSTITIS: Primary | ICD-10-CM

## 2024-01-22 DIAGNOSIS — R39.9 UTI SYMPTOMS: ICD-10-CM

## 2024-01-22 LAB
BILIRUBIN, URINE, POC: NEGATIVE
BLOOD URINE, POC: ABNORMAL
GLUCOSE URINE, POC: NEGATIVE
KETONES, URINE, POC: NEGATIVE
LEUKOCYTE ESTERASE, URINE, POC: ABNORMAL
NITRITE, URINE, POC: NEGATIVE
PH, URINE, POC: 5.5 (ref 4.6–8)
PROTEIN,URINE, POC: NEGATIVE
SPECIFIC GRAVITY, URINE, POC: 1.02 (ref 1–1.03)
URINALYSIS CLARITY, POC: ABNORMAL
URINALYSIS COLOR, POC: YELLOW
UROBILINOGEN, POC: ABNORMAL

## 2024-01-22 RX ORDER — CEPHALEXIN 500 MG/1
500 CAPSULE ORAL 2 TIMES DAILY
Qty: 14 CAPSULE | Refills: 0 | Status: SHIPPED | OUTPATIENT
Start: 2024-01-22 | End: 2024-01-29

## 2024-01-22 ASSESSMENT — ENCOUNTER SYMPTOMS
EYES NEGATIVE: 1
GASTROINTESTINAL NEGATIVE: 1
RESPIRATORY NEGATIVE: 1
ALLERGIC/IMMUNOLOGIC NEGATIVE: 1

## 2024-01-22 NOTE — PROGRESS NOTES
2024   Carmencita Tripathi (: 1980) is a 43 y.o. female, New patient, here for evaluation of the following chief complaint(s):  Urinary Tract Infection (Pt reports waking up with UTI sx , after using a scented toilet paper)     ASSESSMENT/PLAN:  Below is the assessment and plan developed based on review of pertinent history, physical exam, labs, studies, and medications.  1. Cystitis  -     cephALEXin (KEFLEX) 500 MG capsule; Take 1 capsule by mouth 2 times daily for 7 days, Disp-14 capsule, R-0Normal  2. UTI symptoms  -     AMB POC URINALYSIS DIP STICK MANUAL W/O MICRO  -     Urine culture (clean catch); Future         Handout given with care instructions  2. OTC for symptom management. Increase fluid intake, ensure adequate nutritional intake.  3. Follow up with PCP as needed.  4. Go to ED with development of any acute symptoms.     Follow up:  Return if symptoms worsen or fail to improve.  Follow up immediately for any new, worsening or changes or if symptoms are not improving over the next 5-7 days.     SUBJECTIVE/OBJECTIVE:    Urinary Tract Infection           SUBJECTIVE: Carmencita Tripathi is a 43 y.o. female who complains of urinary frequency, urgency and dysuria x 1 day, without flank pain, fever, chills, or abnormal discharge or bleeding. Patient currently on BC and has not had a period 7 years. Patient declined pregnancy test.       Urine dipstick shows positive for leukocytes and blood.           TODD Dietz - NP       Diagnoses and all orders for this visit:  UTI symptoms  -     AMB POC URINALYSIS DIP STICK MANUAL W/O MICRO  -     Urine culture (clean catch); Future      Urinary Tract Infection (Pt reports waking up with UTI sx , after using a scented toilet paper)      Results for orders placed or performed in visit on 24   AMB POC URINALYSIS DIP STICK MANUAL W/O MICRO   Result Value Ref Range    Color (UA POC) Yellow     Clarity (UA POC) Cloudy     Glucose, Urine, POC Negative

## 2024-01-22 NOTE — PATIENT INSTRUCTIONS
Drink plenty of fluids. If you develop fever, abdomenal pain, back pain, or nausea and vomiting then return to clinic or go to the emergency room.  This could indicate that you have a more serious infection or and infection in the kidneys.   
gait, locomotion, and balance

## 2024-01-24 LAB
BACTERIA SPEC CULT: NORMAL
CC UR VC: NORMAL
SERVICE CMNT-IMP: NORMAL

## 2024-01-27 ENCOUNTER — TELEPHONE (OUTPATIENT)
Age: 44
End: 2024-01-27

## 2024-01-27 NOTE — TELEPHONE ENCOUNTER
Attempted to call and discuss results of urine culture although patient is unable to receive at this time, voicemail left on answering machine to return call or refer to patient portal system for those results. If there are any concerns/questions please return call.

## 2024-01-31 ENCOUNTER — OFFICE VISIT (OUTPATIENT)
Age: 44
End: 2024-01-31

## 2024-01-31 VITALS
SYSTOLIC BLOOD PRESSURE: 114 MMHG | HEIGHT: 62 IN | TEMPERATURE: 98.4 F | BODY MASS INDEX: 29.26 KG/M2 | OXYGEN SATURATION: 99 % | WEIGHT: 159 LBS | HEART RATE: 69 BPM | DIASTOLIC BLOOD PRESSURE: 73 MMHG | RESPIRATION RATE: 18 BRPM

## 2024-01-31 DIAGNOSIS — J98.8 CONGESTION OF UPPER AIRWAY: Primary | ICD-10-CM

## 2024-01-31 DIAGNOSIS — J06.9 VIRAL UPPER RESPIRATORY TRACT INFECTION: ICD-10-CM

## 2024-01-31 DIAGNOSIS — R09.89 CHEST CONGESTION: ICD-10-CM

## 2024-01-31 LAB
INFLUENZA A ANTIGEN, POC: NEGATIVE
INFLUENZA B ANTIGEN, POC: NEGATIVE
Lab: NORMAL
QC PASS/FAIL: NORMAL
SARS-COV-2 RDRP RESP QL NAA+PROBE: NEGATIVE
VALID INTERNAL CONTROL, POC: NORMAL

## 2024-01-31 NOTE — PATIENT INSTRUCTIONS
Results for orders placed or performed in visit on 01/31/24   POCT COVID-19 Rapid, NAAT   Result Value Ref Range    SARS-COV-2, RdRp gene Negative Negative    Lot Number [id number]     QC Pass/Fail Pass    AMB POC INFLUENZA A  AND B REAL-TIME RT-PCR   Result Value Ref Range    Valid Internal Control, POC Pass     Influenza A Antigen, POC Negative     Influenza B Antigen, POC Negative     You have been diagnosed with an Upper respiratory viral infection. It is important to monitor your fever and take Tylenol and/or ibuprofen to keep your fever down and reduce body aches. For nasal congestion, Flonase nasal spray may be used for nasal stuffiness. To dry up nasal secretions you may use Sudafed, if you do not have high blood pressure. For people with hypertension, use Coricidin HBP.    It is also important to maintain good hydration, drink at least 64 ounces of fluid, water, juice, gingerale and gatorade are good choices. Avoid drinks with caffeine as they do not hydrate. Monitor for good urine output.     If fever persists, you develop abdominal pain, vomiting or shortness of breath, or do not  improve, please call PCP or go to nearest ED.     Your fever usually resolves in 48 to 72 hours. Other symptoms, such as cough and nasal stuffiness usually resolve in 7 to 10 days, but may last longer.    Thank you for coming in to the [facility] today. I hope you are feeling better soon!

## 2024-01-31 NOTE — PROGRESS NOTES
Carmencita Tripathi (:  1980) is a 43 y.o. female,Established patient, here for evaluation of the following chief complaint(s):  Pharyngitis (Stomach pain, throat hurts, congestion, runny nose x3 days )        Assessment    1. Congestion of upper airway  -     POCT COVID-19 Rapid, NAAT  -     AMB POC INFLUENZA A  AND B REAL-TIME RT-PCR  2. Chest congestion  3. Viral upper respiratory tract infection     Plan    Reviewed Covid and Influenza testing are negative.Likely has an undefined Viral upper respiratory infection. There is no evidence of purulent sinus discharge, adventitious breath sounds or other findings to suggest a bacterial component. Recommendation to treat fever, with appropriate antipyretics along with adequate oral hydration, and rest were reviewed. Patient should monitor symptoms, if development of lethargy or abdominal pain present she should seek re-evaluation. Otherwise, I reviewed acute symptoms, usually begin to resolve in 72 hours, though milder symptoms, particularly runny nose, cough, fatigue may last 7 to 10 days.      Subjective    HPI  43-year-old patient with symptoms for the past 48 hours.  Patient states she returned from a trip to [place] and started feeling poorly.  She does not have a fever spike but states she started with a sore throat that went on to develop a runny nose, feeling congested and now with an upset stomach.  Patient does report that she completed antibiotic therapy for UTI about 4 days ago.  Discussed with the patient if this was influenza she is now 48 hours post and likely would have less benefit from Tamiflu.  Will test for COVID and Influenza, she works in healthcare.   Review of Systems    Review of Systems   Upset stomach, has Zofran. May be related to recent antibiotics. No fever or emesis. History of RA, Lupus, takes Skyrizi.    Allergies   Allergen Reactions    Latex Other (See Comments)     Blisters      Tofacitinib Anaphylaxis    Adalimumab Hives

## 2024-02-02 ENCOUNTER — APPOINTMENT (OUTPATIENT)
Facility: HOSPITAL | Age: 44
End: 2024-02-02
Payer: COMMERCIAL

## 2024-02-02 ENCOUNTER — HOSPITAL ENCOUNTER (EMERGENCY)
Facility: HOSPITAL | Age: 44
Discharge: HOME | End: 2024-02-02
Attending: EMERGENCY MEDICINE
Payer: COMMERCIAL

## 2024-02-02 ENCOUNTER — OFFICE VISIT (OUTPATIENT)
Age: 44
End: 2024-02-02

## 2024-02-02 VITALS
DIASTOLIC BLOOD PRESSURE: 65 MMHG | WEIGHT: 157 LBS | BODY MASS INDEX: 28.89 KG/M2 | SYSTOLIC BLOOD PRESSURE: 96 MMHG | HEART RATE: 72 BPM | HEIGHT: 62 IN | OXYGEN SATURATION: 98 % | RESPIRATION RATE: 20 BRPM | TEMPERATURE: 97.8 F

## 2024-02-02 VITALS
SYSTOLIC BLOOD PRESSURE: 116 MMHG | BODY MASS INDEX: 28.89 KG/M2 | HEIGHT: 62 IN | WEIGHT: 157 LBS | TEMPERATURE: 97.9 F | DIASTOLIC BLOOD PRESSURE: 75 MMHG | HEART RATE: 74 BPM | RESPIRATION RATE: 16 BRPM | OXYGEN SATURATION: 99 %

## 2024-02-02 DIAGNOSIS — N39.0 URINARY TRACT INFECTION WITHOUT HEMATURIA, SITE UNSPECIFIED: Primary | ICD-10-CM

## 2024-02-02 DIAGNOSIS — R10.84 GENERALIZED ABDOMINAL PAIN: Primary | ICD-10-CM

## 2024-02-02 DIAGNOSIS — R11.0 NAUSEA: ICD-10-CM

## 2024-02-02 DIAGNOSIS — R19.7 DIARRHEA, UNSPECIFIED TYPE: ICD-10-CM

## 2024-02-02 DIAGNOSIS — R10.9 RIGHT FLANK PAIN: ICD-10-CM

## 2024-02-02 LAB
ALBUMIN SERPL-MCNC: 3.8 G/DL (ref 3.5–5)
ALBUMIN/GLOB SERPL: 1.1 (ref 1.1–2.2)
ALP SERPL-CCNC: 111 U/L (ref 45–117)
ALT SERPL-CCNC: 33 U/L (ref 12–78)
ANION GAP SERPL CALC-SCNC: 5 MMOL/L (ref 5–15)
APPEARANCE UR: CLEAR
AST SERPL-CCNC: 23 U/L (ref 15–37)
BACTERIA URNS QL MICRO: NEGATIVE /HPF
BASOPHILS # BLD: 0 K/UL (ref 0–0.1)
BASOPHILS NFR BLD: 1 % (ref 0–1)
BILIRUB SERPL-MCNC: 0.8 MG/DL (ref 0.2–1)
BILIRUB UR QL: NEGATIVE
BILIRUBIN, URINE, POC: NEGATIVE
BLOOD URINE, POC: ABNORMAL
BUN SERPL-MCNC: 10 MG/DL (ref 6–20)
BUN/CREAT SERPL: 13 (ref 12–20)
CALCIUM SERPL-MCNC: 9.1 MG/DL (ref 8.5–10.1)
CHLORIDE SERPL-SCNC: 111 MMOL/L (ref 97–108)
CO2 SERPL-SCNC: 23 MMOL/L (ref 21–32)
COLOR UR: ABNORMAL
CREAT SERPL-MCNC: 0.8 MG/DL (ref 0.55–1.02)
DIFFERENTIAL METHOD BLD: ABNORMAL
EOSINOPHIL # BLD: 0 K/UL (ref 0–0.4)
EOSINOPHIL NFR BLD: 1 % (ref 0–7)
EPITH CASTS URNS QL MICRO: ABNORMAL /LPF
ERYTHROCYTE [DISTWIDTH] IN BLOOD BY AUTOMATED COUNT: 13 % (ref 11.5–14.5)
GLOBULIN SER CALC-MCNC: 3.5 G/DL (ref 2–4)
GLUCOSE SERPL-MCNC: 99 MG/DL (ref 65–100)
GLUCOSE UR STRIP.AUTO-MCNC: NEGATIVE MG/DL
GLUCOSE URINE, POC: NEGATIVE
HCG UR QL: NEGATIVE
HCT VFR BLD AUTO: 41.6 % (ref 35–47)
HGB BLD-MCNC: 14.4 G/DL (ref 11.5–16)
HGB UR QL STRIP: ABNORMAL
HYALINE CASTS URNS QL MICRO: ABNORMAL /LPF (ref 0–2)
IMM GRANULOCYTES # BLD AUTO: 0 K/UL (ref 0–0.04)
IMM GRANULOCYTES NFR BLD AUTO: 0 % (ref 0–0.5)
KETONES UR QL STRIP.AUTO: ABNORMAL MG/DL
KETONES, URINE, POC: NEGATIVE
LEUKOCYTE ESTERASE UR QL STRIP.AUTO: ABNORMAL
LEUKOCYTE ESTERASE, URINE, POC: ABNORMAL
LYMPHOCYTES # BLD: 1.6 K/UL (ref 0.8–3.5)
LYMPHOCYTES NFR BLD: 23 % (ref 12–49)
MCH RBC QN AUTO: 28.3 PG (ref 26–34)
MCHC RBC AUTO-ENTMCNC: 34.6 G/DL (ref 30–36.5)
MCV RBC AUTO: 81.9 FL (ref 80–99)
MONOCYTES # BLD: 0.5 K/UL (ref 0–1)
MONOCYTES NFR BLD: 7 % (ref 5–13)
NEUTS SEG # BLD: 4.6 K/UL (ref 1.8–8)
NEUTS SEG NFR BLD: 68 % (ref 32–75)
NITRITE UR QL STRIP.AUTO: NEGATIVE
NITRITE, URINE, POC: NEGATIVE
NRBC # BLD: 0 K/UL (ref 0–0.01)
NRBC BLD-RTO: 0 PER 100 WBC
PH UR STRIP: 6 (ref 5–8)
PH, URINE, POC: 6 (ref 4.6–8)
PLATELET # BLD AUTO: 401 K/UL (ref 150–400)
PMV BLD AUTO: 10 FL (ref 8.9–12.9)
POTASSIUM SERPL-SCNC: 4.2 MMOL/L (ref 3.5–5.1)
PROT SERPL-MCNC: 7.3 G/DL (ref 6.4–8.2)
PROT UR STRIP-MCNC: NEGATIVE MG/DL
PROTEIN,URINE, POC: ABNORMAL
RBC # BLD AUTO: 5.08 M/UL (ref 3.8–5.2)
RBC #/AREA URNS HPF: ABNORMAL /HPF (ref 0–5)
SODIUM SERPL-SCNC: 139 MMOL/L (ref 136–145)
SP GR UR REFRACTOMETRY: 1.02 (ref 1–1.03)
SPECIFIC GRAVITY, URINE, POC: 1.02 (ref 1–1.03)
URINALYSIS CLARITY, POC: ABNORMAL
URINALYSIS COLOR, POC: ABNORMAL
URINE CULTURE IF INDICATED: ABNORMAL
UROBILINOGEN UR QL STRIP.AUTO: 1 EU/DL (ref 0.2–1)
UROBILINOGEN, POC: ABNORMAL
WBC # BLD AUTO: 6.8 K/UL (ref 3.6–11)
WBC URNS QL MICRO: ABNORMAL /HPF (ref 0–4)

## 2024-02-02 PROCEDURE — 36415 COLL VENOUS BLD VENIPUNCTURE: CPT

## 2024-02-02 PROCEDURE — 81001 URINALYSIS AUTO W/SCOPE: CPT

## 2024-02-02 PROCEDURE — 2580000003 HC RX 258: Performed by: NURSE PRACTITIONER

## 2024-02-02 PROCEDURE — 96376 TX/PRO/DX INJ SAME DRUG ADON: CPT

## 2024-02-02 PROCEDURE — 80053 COMPREHEN METABOLIC PANEL: CPT

## 2024-02-02 PROCEDURE — 85025 COMPLETE CBC W/AUTO DIFF WBC: CPT

## 2024-02-02 PROCEDURE — 76770 US EXAM ABDO BACK WALL COMP: CPT

## 2024-02-02 PROCEDURE — 99284 EMERGENCY DEPT VISIT MOD MDM: CPT

## 2024-02-02 PROCEDURE — 74176 CT ABD & PELVIS W/O CONTRAST: CPT

## 2024-02-02 PROCEDURE — 81025 URINE PREGNANCY TEST: CPT

## 2024-02-02 PROCEDURE — 96374 THER/PROPH/DIAG INJ IV PUSH: CPT

## 2024-02-02 PROCEDURE — 6360000002 HC RX W HCPCS: Performed by: NURSE PRACTITIONER

## 2024-02-02 PROCEDURE — 96375 TX/PRO/DX INJ NEW DRUG ADDON: CPT

## 2024-02-02 RX ORDER — ONDANSETRON 2 MG/ML
4 INJECTION INTRAMUSCULAR; INTRAVENOUS ONCE
Status: COMPLETED | OUTPATIENT
Start: 2024-02-02 | End: 2024-02-02

## 2024-02-02 RX ORDER — 0.9 % SODIUM CHLORIDE 0.9 %
1000 INTRAVENOUS SOLUTION INTRAVENOUS ONCE
Status: COMPLETED | OUTPATIENT
Start: 2024-02-02 | End: 2024-02-02

## 2024-02-02 RX ORDER — ONDANSETRON 4 MG/1
4 TABLET, FILM COATED ORAL DAILY PRN
Qty: 20 TABLET | Refills: 0 | Status: SHIPPED | OUTPATIENT
Start: 2024-02-02

## 2024-02-02 RX ORDER — KETOROLAC TROMETHAMINE 15 MG/ML
15 INJECTION, SOLUTION INTRAMUSCULAR; INTRAVENOUS ONCE
Status: COMPLETED | OUTPATIENT
Start: 2024-02-02 | End: 2024-02-02

## 2024-02-02 RX ORDER — SULFAMETHOXAZOLE AND TRIMETHOPRIM 800; 160 MG/1; MG/1
1 TABLET ORAL 2 TIMES DAILY
Qty: 14 TABLET | Refills: 0 | Status: SHIPPED | OUTPATIENT
Start: 2024-02-02 | End: 2024-02-04

## 2024-02-02 RX ORDER — SACCHAROMYCES BOULARDII 250 MG
250 CAPSULE ORAL 2 TIMES DAILY
Qty: 28 CAPSULE | Refills: 0 | Status: SHIPPED | OUTPATIENT
Start: 2024-02-02 | End: 2024-02-16

## 2024-02-02 RX ADMIN — KETOROLAC TROMETHAMINE 15 MG: 15 INJECTION, SOLUTION INTRAMUSCULAR; INTRAVENOUS at 12:00

## 2024-02-02 RX ADMIN — ONDANSETRON 4 MG: 2 INJECTION INTRAMUSCULAR; INTRAVENOUS at 11:59

## 2024-02-02 RX ADMIN — SODIUM CHLORIDE 1000 ML: 9 INJECTION, SOLUTION INTRAVENOUS at 11:58

## 2024-02-02 RX ADMIN — ONDANSETRON 4 MG: 2 INJECTION INTRAMUSCULAR; INTRAVENOUS at 13:06

## 2024-02-02 ASSESSMENT — PAIN DESCRIPTION - DESCRIPTORS: DESCRIPTORS: ACHING

## 2024-02-02 ASSESSMENT — PAIN DESCRIPTION - ORIENTATION: ORIENTATION: RIGHT;LEFT

## 2024-02-02 ASSESSMENT — PAIN DESCRIPTION - LOCATION: LOCATION: BACK

## 2024-02-02 ASSESSMENT — PAIN - FUNCTIONAL ASSESSMENT: PAIN_FUNCTIONAL_ASSESSMENT: 0-10

## 2024-02-02 ASSESSMENT — PAIN SCALES - GENERAL: PAINLEVEL_OUTOF10: 8

## 2024-02-02 NOTE — ED PROVIDER NOTES
[facility] EMERGENCY DEPT  EMERGENCY DEPARTMENT ENCOUNTER      Pt Name: Carmencita Tripathi  MRN: [id number]  Birthdate 1980  Date of evaluation: 2/2/2024  Provider: TODD Ibrahim NP      HISTORY OF PRESENT ILLNESS      This is a 43-year-old female who presents ambulatory to the emergency room with complaints of being seen in urgent care for urinary tract infection symptoms.  States she was diagnosed with UTI and finished a course of Cipro approximately 4 days ago.  States today she has had worsening and continued pressure with nausea and bilateral lower back pain extending into her abdomen.  Denies any chest pain, shortness of breath, dizziness, vomiting, fevers or chills.  No hematuria, no dysuria.  No further complaints.    Past Medical History:  No date: Anxiety  No date: Psoriasis  No date: Psoriatic arthritis (HCC)  No date: SVT (supraventricular tachycardia)  No past surgical history on file.        The history is provided by the patient.           Nursing Notes were reviewed.    REVIEW OF SYSTEMS         Review of Systems   Constitutional:  Negative for appetite change, chills, diaphoresis and fatigue.   HENT:  Negative for congestion, sinus pressure, sinus pain and trouble swallowing.    Eyes:  Negative for pain and redness.   Respiratory:  Negative for cough and shortness of breath.    Cardiovascular:  Negative for chest pain and palpitations.   Gastrointestinal:  Positive for abdominal pain and nausea. Negative for abdominal distention and vomiting.   Genitourinary:  Positive for flank pain. Negative for difficulty urinating, frequency and urgency.   Musculoskeletal:  Negative for arthralgias, neck pain and neck stiffness.   Skin:  Negative for color change, pallor, rash and wound.   Neurological:  Negative for speech difficulty and headaches.   Hematological:  Does not bruise/bleed easily.   Psychiatric/Behavioral:  The patient is not nervous/anxious.            PAST MEDICAL HISTORY     Past

## 2024-02-02 NOTE — ED TRIAGE NOTES
Pt arrives with the c/c of being seen at urgent care for UTI; pt reports was diagnosed with UTI and finished course of cipro four days ago, and since then has continued to have pressure with nausea and bilateral lower back pain.

## 2024-02-02 NOTE — PROGRESS NOTES
Behavior normal.       Results for orders placed or performed in visit on 02/02/24   AMB POC URINALYSIS DIP STICK AUTO W/O MICRO   Result Value Ref Range    Color, Urine, POC      Clarity, Urine, POC      Glucose, Urine, POC Negative     Bilirubin, Urine, POC Negative     Ketones, Urine, POC Negative     Specific Gravity, Urine, POC 1.025 1.001 - 1.035    Blood, Urine, POC 2+     pH, Urine, POC 6.0 4.6 - 8.0    Protein, Urine, POC 1+     Urobilinogen, POC 0.2 mg/dL     Nitrite, Urine, POC Negative     Leukocyte Esterase, Urine, POC 1+            An electronic signature was used to authenticate this note.      Buster Booker, APRN - CNP

## 2024-02-04 ENCOUNTER — APPOINTMENT (OUTPATIENT)
Facility: HOSPITAL | Age: 44
End: 2024-02-04
Payer: COMMERCIAL

## 2024-02-04 ENCOUNTER — HOSPITAL ENCOUNTER (EMERGENCY)
Facility: HOSPITAL | Age: 44
Discharge: HOME | End: 2024-02-04
Attending: EMERGENCY MEDICINE
Payer: COMMERCIAL

## 2024-02-04 VITALS
RESPIRATION RATE: 16 BRPM | DIASTOLIC BLOOD PRESSURE: 55 MMHG | TEMPERATURE: 97.5 F | OXYGEN SATURATION: 96 % | WEIGHT: 157 LBS | HEART RATE: 76 BPM | BODY MASS INDEX: 28.89 KG/M2 | HEIGHT: 62 IN | SYSTOLIC BLOOD PRESSURE: 96 MMHG

## 2024-02-04 DIAGNOSIS — K59.00 CONSTIPATION, UNSPECIFIED CONSTIPATION TYPE: ICD-10-CM

## 2024-02-04 DIAGNOSIS — R10.9 ABDOMINAL CRAMPING: ICD-10-CM

## 2024-02-04 DIAGNOSIS — Z87.440 HISTORY OF UTI: Primary | ICD-10-CM

## 2024-02-04 LAB
ALBUMIN SERPL-MCNC: 3.7 G/DL (ref 3.5–5)
ALBUMIN/GLOB SERPL: 0.9 (ref 1.1–2.2)
ALP SERPL-CCNC: 104 U/L (ref 45–117)
ALT SERPL-CCNC: 26 U/L (ref 12–78)
ANION GAP SERPL CALC-SCNC: 5 MMOL/L (ref 5–15)
APPEARANCE UR: CLEAR
AST SERPL-CCNC: 21 U/L (ref 15–37)
BACTERIA URNS QL MICRO: NEGATIVE /HPF
BASOPHILS # BLD: 0 K/UL (ref 0–0.1)
BASOPHILS NFR BLD: 1 % (ref 0–1)
BILIRUB SERPL-MCNC: 0.6 MG/DL (ref 0.2–1)
BILIRUB UR QL: NEGATIVE
BUN SERPL-MCNC: 11 MG/DL (ref 6–20)
BUN/CREAT SERPL: 11 (ref 12–20)
CALCIUM SERPL-MCNC: 9.1 MG/DL (ref 8.5–10.1)
CHLORIDE SERPL-SCNC: 111 MMOL/L (ref 97–108)
CO2 SERPL-SCNC: 21 MMOL/L (ref 21–32)
COLOR UR: ABNORMAL
CREAT SERPL-MCNC: 0.97 MG/DL (ref 0.55–1.02)
DIFFERENTIAL METHOD BLD: ABNORMAL
EOSINOPHIL # BLD: 0.1 K/UL (ref 0–0.4)
EOSINOPHIL NFR BLD: 1 % (ref 0–7)
EPITH CASTS URNS QL MICRO: ABNORMAL /LPF
ERYTHROCYTE [DISTWIDTH] IN BLOOD BY AUTOMATED COUNT: 12.9 % (ref 11.5–14.5)
GLOBULIN SER CALC-MCNC: 3.9 G/DL (ref 2–4)
GLUCOSE SERPL-MCNC: 98 MG/DL (ref 65–100)
GLUCOSE UR STRIP.AUTO-MCNC: NEGATIVE MG/DL
HCT VFR BLD AUTO: 43.9 % (ref 35–47)
HGB BLD-MCNC: 14.9 G/DL (ref 11.5–16)
HGB UR QL STRIP: ABNORMAL
HYALINE CASTS URNS QL MICRO: ABNORMAL /LPF (ref 0–2)
IMM GRANULOCYTES # BLD AUTO: 0 K/UL (ref 0–0.04)
IMM GRANULOCYTES NFR BLD AUTO: 0 % (ref 0–0.5)
KETONES UR QL STRIP.AUTO: NEGATIVE MG/DL
LEUKOCYTE ESTERASE UR QL STRIP.AUTO: NEGATIVE
LIPASE SERPL-CCNC: 65 U/L (ref 13–75)
LYMPHOCYTES # BLD: 1.9 K/UL (ref 0.8–3.5)
LYMPHOCYTES NFR BLD: 31 % (ref 12–49)
MAGNESIUM SERPL-MCNC: 2 MG/DL (ref 1.6–2.4)
MCH RBC QN AUTO: 28.1 PG (ref 26–34)
MCHC RBC AUTO-ENTMCNC: 33.9 G/DL (ref 30–36.5)
MCV RBC AUTO: 82.8 FL (ref 80–99)
MONOCYTES # BLD: 0.4 K/UL (ref 0–1)
MONOCYTES NFR BLD: 7 % (ref 5–13)
NEUTS SEG # BLD: 3.6 K/UL (ref 1.8–8)
NEUTS SEG NFR BLD: 60 % (ref 32–75)
NITRITE UR QL STRIP.AUTO: NEGATIVE
NRBC # BLD: 0 K/UL (ref 0–0.01)
NRBC BLD-RTO: 0 PER 100 WBC
PH UR STRIP: 5.5 (ref 5–8)
PLATELET # BLD AUTO: 388 K/UL (ref 150–400)
PMV BLD AUTO: 9.8 FL (ref 8.9–12.9)
POTASSIUM SERPL-SCNC: 4.4 MMOL/L (ref 3.5–5.1)
PROT SERPL-MCNC: 7.6 G/DL (ref 6.4–8.2)
PROT UR STRIP-MCNC: NEGATIVE MG/DL
RBC # BLD AUTO: 5.3 M/UL (ref 3.8–5.2)
RBC #/AREA URNS HPF: ABNORMAL /HPF (ref 0–5)
SODIUM SERPL-SCNC: 137 MMOL/L (ref 136–145)
SP GR UR REFRACTOMETRY: 1.02 (ref 1–1.03)
UROBILINOGEN UR QL STRIP.AUTO: 0.2 EU/DL (ref 0.2–1)
WBC # BLD AUTO: 6 K/UL (ref 3.6–11)
WBC URNS QL MICRO: ABNORMAL /HPF (ref 0–4)

## 2024-02-04 PROCEDURE — 81001 URINALYSIS AUTO W/SCOPE: CPT

## 2024-02-04 PROCEDURE — 6360000002 HC RX W HCPCS: Performed by: EMERGENCY MEDICINE

## 2024-02-04 PROCEDURE — 6370000000 HC RX 637 (ALT 250 FOR IP): Performed by: EMERGENCY MEDICINE

## 2024-02-04 PROCEDURE — 80053 COMPREHEN METABOLIC PANEL: CPT

## 2024-02-04 PROCEDURE — 96375 TX/PRO/DX INJ NEW DRUG ADDON: CPT

## 2024-02-04 PROCEDURE — 83690 ASSAY OF LIPASE: CPT

## 2024-02-04 PROCEDURE — 83735 ASSAY OF MAGNESIUM: CPT

## 2024-02-04 PROCEDURE — 85025 COMPLETE CBC W/AUTO DIFF WBC: CPT

## 2024-02-04 PROCEDURE — 99284 EMERGENCY DEPT VISIT MOD MDM: CPT

## 2024-02-04 PROCEDURE — 76770 US EXAM ABDO BACK WALL COMP: CPT

## 2024-02-04 PROCEDURE — 87086 URINE CULTURE/COLONY COUNT: CPT

## 2024-02-04 PROCEDURE — 36415 COLL VENOUS BLD VENIPUNCTURE: CPT

## 2024-02-04 PROCEDURE — 96374 THER/PROPH/DIAG INJ IV PUSH: CPT

## 2024-02-04 PROCEDURE — 74018 RADEX ABDOMEN 1 VIEW: CPT

## 2024-02-04 RX ORDER — AMOXICILLIN 250 MG
2 CAPSULE ORAL NIGHTLY PRN
Qty: 20 TABLET | Refills: 0 | Status: SHIPPED | OUTPATIENT
Start: 2024-02-04

## 2024-02-04 RX ORDER — CEFDINIR 300 MG/1
300 CAPSULE ORAL 2 TIMES DAILY
Qty: 10 CAPSULE | Refills: 0 | Status: SHIPPED | OUTPATIENT
Start: 2024-02-04 | End: 2024-02-09

## 2024-02-04 RX ORDER — DICYCLOMINE HYDROCHLORIDE 10 MG/1
10 CAPSULE ORAL EVERY 6 HOURS PRN
Qty: 20 CAPSULE | Refills: 0 | Status: SHIPPED | OUTPATIENT
Start: 2024-02-04 | End: 2024-02-09

## 2024-02-04 RX ORDER — DIPHENHYDRAMINE HCL 25 MG
25 CAPSULE ORAL
Status: COMPLETED | OUTPATIENT
Start: 2024-02-04 | End: 2024-02-04

## 2024-02-04 RX ORDER — DIPHENHYDRAMINE HYDROCHLORIDE 50 MG/ML
12.5 INJECTION INTRAMUSCULAR; INTRAVENOUS ONCE
Status: DISCONTINUED | OUTPATIENT
Start: 2024-02-04 | End: 2024-02-04

## 2024-02-04 RX ORDER — DROPERIDOL 2.5 MG/ML
0.62 INJECTION, SOLUTION INTRAMUSCULAR; INTRAVENOUS EVERY 6 HOURS PRN
Status: DISCONTINUED | OUTPATIENT
Start: 2024-02-04 | End: 2024-02-04 | Stop reason: HOSPADM

## 2024-02-04 RX ORDER — KETOROLAC TROMETHAMINE 15 MG/ML
15 INJECTION, SOLUTION INTRAMUSCULAR; INTRAVENOUS ONCE
Status: COMPLETED | OUTPATIENT
Start: 2024-02-04 | End: 2024-02-04

## 2024-02-04 RX ADMIN — KETOROLAC TROMETHAMINE 15 MG: 15 INJECTION, SOLUTION INTRAMUSCULAR; INTRAVENOUS at 11:30

## 2024-02-04 RX ADMIN — DIPHENHYDRAMINE HYDROCHLORIDE 25 MG: 25 CAPSULE ORAL at 11:30

## 2024-02-04 RX ADMIN — DROPERIDOL 0.62 MG: 2.5 INJECTION, SOLUTION INTRAMUSCULAR; INTRAVENOUS at 11:30

## 2024-02-04 ASSESSMENT — PAIN - FUNCTIONAL ASSESSMENT: PAIN_FUNCTIONAL_ASSESSMENT: 0-10

## 2024-02-04 NOTE — ED TRIAGE NOTES
Ambulatory with complaints of left flank pain and generalized abdominal pain with nausea since Monday. She reports she was seen here Tuesday for the same and had a negative CT and ultrasound and was started on Bactrim. She states she has been on multiple courses of antibiotics since Christmas stating \"They just keep telling me I have blood in my urine\".     Denies vomiting or diarrhea.

## 2024-02-04 NOTE — DISCHARGE INSTRUCTIONS
The cause of your symptoms is unknown still, but you do not have evidence of bowel obstruction, abscess, appendicitis, or internal bleeding. Your labs and imaging appear reassuring. Because your symptoms are better controlled and you can tolerate fluid, we are allowing you to go home. Make sure to drink plenty of clear fluids. Use medications as prescribed. Contact your primary care provider or the provided specialists for further evaluation.    You may take acetaminophen (Tylenol), either 3 regular strength (325mg) tablets or 2 extra-strength (500mg) tablets every 6 hours as well as an anti-inflammatory, either prescribed (Voltaren, Mobic) or over-the-counter (2 naproxen aka Aleve every 12 hours OR 3 ibuprofen aka Motrin every 6 hours) as needed for pain. Taking both the Tylenol as well as anti-inflammatory medications in combination can be especially effective.    It is rare but possible that some things can be missed on your initial workup; if you notice pain or vomiting that cannot be controlled, fevers, weakness, large amounts of blood in your stool or vomit, unable to urinate, or you are becoming confused or unable to get out of bed, return to the ER for evaluation.   =========  72 HOUR BOWEL PROTOCOL FOR CONSTIPATION  Day 1: Take two Senokot-S tablets at bedtime   Day 2: If there is no bowel movement, take two Senokot-S after  breakfast and take three Senokot-S tablets at bedtime  Day 3: If there is no bowel movement, take four Senokat-S tablets after breakfast and then four Senokat-S tablets at bedtime. In addition, add two tablespoons of Milk of Magnesia after breakfast and dinner while continuing to take up to eight Senokot-S tablets a day.  Day 4: If there is no bowel movement within 72 hours after starting this protocol, add Dulcolax 2-3 tablets three times per day as well as one 10 mg Dulcolax suppository or a Fleet’s enema.    -Pain medications can be very constipating. If you take pain medications you

## 2024-02-05 LAB
BACTERIA SPEC CULT: NORMAL
SERVICE CMNT-IMP: NORMAL

## 2024-02-05 NOTE — ED PROVIDER NOTES
[facility] EMERGENCY DEPT  EMERGENCY DEPARTMENT ENCOUNTER      Pt Name: Carmencita Tripathi  MRN: [id number]  Birthdate 1980  Date of evaluation: 2/4/2024  Provider: Compa Siegel MD    CHIEF COMPLAINT       Chief Complaint   Patient presents with    Abdominal Pain    Back Pain         HISTORY OF PRESENT ILLNESS   (Location/Symptom, Timing/Onset, Context/Setting, Quality, Duration, Modifying Factors, Severity)  Note limiting factors.   HPI  43 y.o. female with PMHx significant for mood disorder and psoriatic arthritis presents to ED with recurrent flank pain radiating to abdomen as well as noted intermittent hematuria. This has been ongoing for the past 2 months, treated for possible UTI with cipro, seen in ED 2 days ago and changed to bactrim. She has had nausea but no emesis, no fevers, no urinary retention noted. She has been ambulatory w/o difficulty.    Nursing Notes were reviewed.    REVIEW OF SYSTEMS    (2-9 systems for level 4, 10 or more for level 5)     Review of Systems    Except as noted above the remainder of the review of systems was reviewed and negative.       PAST MEDICAL HISTORY     Past Medical History:   Diagnosis Date    Anxiety     Psoriasis     Psoriatic arthritis (HCC)     SVT (supraventricular tachycardia)          SURGICAL HISTORY     History reviewed. No pertinent surgical history.      CURRENT MEDICATIONS       Discharge Medication List as of 2/4/2024  1:30 PM        CONTINUE these medications which have NOT CHANGED    Details   saccharomyces boulardii (FLORASTOR) 250 MG capsule Take 1 capsule by mouth 2 times daily for 14 days, Disp-28 capsule, R-0Normal      ondansetron (ZOFRAN) 4 MG tablet Take 1 tablet by mouth daily as needed for Nausea or Vomiting, Disp-20 tablet, R-0Normal      topiramate (TOPAMAX) 25 MG tablet Take 1 tablet by mouth nightly, Disp-30 tablet, R-5Normal      butalbital-acetaminophen-caffeine (FIORICET, ESGIC) -40 MG per tablet Historical Med      Rimegepant

## 2024-02-08 ENCOUNTER — TELEPHONE (OUTPATIENT)
Age: 44
End: 2024-02-08

## 2024-02-08 NOTE — TELEPHONE ENCOUNTER
RE:[employer]  Key: [id number]      Rcvd the following message via CMM:      This drug/product is not covered under the pharmacy benefit. Prior Authorization is not available         Nurse aware

## 2024-02-12 RX ORDER — TOPIRAMATE 25 MG/1
25 TABLET ORAL NIGHTLY
Qty: 90 TABLET | Refills: 3 | OUTPATIENT
Start: 2024-02-12

## 2024-02-12 RX ORDER — CARVEDILOL 12.5 MG/1
12.5 TABLET ORAL 2 TIMES DAILY
Qty: 180 TABLET | Refills: 3 | Status: SHIPPED | OUTPATIENT
Start: 2024-02-12

## 2024-02-12 RX ORDER — CARVEDILOL 12.5 MG/1
12.5 TABLET ORAL 2 TIMES DAILY
Qty: 180 TABLET | Refills: 3 | Status: SHIPPED | OUTPATIENT
Start: 2024-02-12 | End: 2024-02-12

## 2024-02-13 NOTE — TELEPHONE ENCOUNTER
Called and spoke with MALINDA Aguirre at [facility]. Notified her that PCP has approved the requested home care orders below. She verbalized understanding & had no further questions/concerns at this time.     Anay Anaya, PATRICION, RN   [facility]     Please review and sign if appropriate

## 2024-02-16 ENCOUNTER — OFFICE VISIT (OUTPATIENT)
Age: 44
End: 2024-02-16

## 2024-02-16 VITALS
TEMPERATURE: 98.8 F | HEIGHT: 62 IN | RESPIRATION RATE: 18 BRPM | SYSTOLIC BLOOD PRESSURE: 115 MMHG | OXYGEN SATURATION: 97 % | DIASTOLIC BLOOD PRESSURE: 80 MMHG | BODY MASS INDEX: 28.89 KG/M2 | WEIGHT: 157 LBS | HEART RATE: 81 BPM

## 2024-02-16 DIAGNOSIS — J02.9 SORE THROAT: ICD-10-CM

## 2024-02-16 DIAGNOSIS — J02.0 STREP PHARYNGITIS: Primary | ICD-10-CM

## 2024-02-16 LAB
STREP PYOGENES DNA, POC: POSITIVE
VALID INTERNAL CONTROL, POC: YES

## 2024-02-16 RX ORDER — CEFDINIR 300 MG/1
600 CAPSULE ORAL DAILY
Qty: 20 CAPSULE | Refills: 0 | Status: SHIPPED | OUTPATIENT
Start: 2024-02-16 | End: 2024-02-26

## 2024-02-16 NOTE — PROGRESS NOTES
Positive        Results for orders placed or performed in visit on 02/16/24   AMB POC STREP GO A DIRECT, DNA PROBE   Result Value Ref Range    Valid Internal Control, POC yes     Strep pyogenes DNA, POC Positive      XR Results (most recent):  @ROSEMARY(TNM8768:1)@         Review of Systems   Constitutional: Negative.    HENT:  Positive for sore throat.    Eyes: Negative.    Respiratory: Negative.     Cardiovascular: Negative.    Gastrointestinal:  Positive for abdominal pain.   Endocrine: Negative.    Genitourinary: Negative.    Musculoskeletal: Negative.    Skin: Negative.    Allergic/Immunologic: Negative.    Neurological: Negative.    Hematological: Negative.    Psychiatric/Behavioral: Negative.           Physical Exam  Constitutional:       Appearance: She is normal weight.   HENT:      Head: Normocephalic.      Right Ear: Tympanic membrane normal.      Left Ear: Tympanic membrane normal.      Nose: Nose normal.      Mouth/Throat:      Mouth: Mucous membranes are moist.      Pharynx: Oropharynx is clear. Posterior oropharyngeal erythema present.      Tonsils: Tonsillar exudate present.   Eyes:      Pupils: Pupils are equal, round, and reactive to light.   Cardiovascular:      Rate and Rhythm: Normal rate and regular rhythm.      Pulses: Normal pulses.      Heart sounds: Normal heart sounds.   Pulmonary:      Effort: Pulmonary effort is normal.      Breath sounds: Normal breath sounds.   Abdominal:      General: Abdomen is flat. Bowel sounds are normal.   Musculoskeletal:         General: Normal range of motion.      Cervical back: Normal range of motion.   Skin:     General: Skin is warm and dry.   Neurological:      General: No focal deficit present.      Mental Status: She is alert.   Psychiatric:         Mood and Affect: Mood normal.         Behavior: Behavior normal.        Vitals:    02/16/24 0918   BP: 115/80   Site: Right Upper Arm   Position: Sitting   Cuff Size: Large Adult   Pulse: 81   Resp:

## 2024-02-17 RX ORDER — PREDNISONE 20 MG/1
20 TABLET ORAL 2 TIMES DAILY
Qty: 10 TABLET | Refills: 0 | Status: CANCELLED | OUTPATIENT
Start: 2024-02-17 | End: 2024-02-22

## 2024-02-17 RX ORDER — PREDNISONE 20 MG/1
20 TABLET ORAL 2 TIMES DAILY
Qty: 10 TABLET | Refills: 0 | Status: SHIPPED | OUTPATIENT
Start: 2024-02-17 | End: 2024-02-22

## 2024-02-17 RX ORDER — PREDNISONE 20 MG/1
20 TABLET ORAL 2 TIMES DAILY
Qty: 10 TABLET | Refills: 0 | Status: SHIPPED | OUTPATIENT
Start: 2024-02-17 | End: 2024-02-17

## 2024-02-21 ENCOUNTER — OFFICE VISIT (OUTPATIENT)
Age: 44
End: 2024-02-21

## 2024-02-21 VITALS
RESPIRATION RATE: 20 BRPM | WEIGHT: 154 LBS | BODY MASS INDEX: 28.34 KG/M2 | SYSTOLIC BLOOD PRESSURE: 108 MMHG | OXYGEN SATURATION: 97 % | HEIGHT: 62 IN | HEART RATE: 78 BPM | TEMPERATURE: 98.1 F | DIASTOLIC BLOOD PRESSURE: 70 MMHG

## 2024-02-21 DIAGNOSIS — H69.93 ACUTE DYSFUNCTION OF EUSTACHIAN TUBE, BILATERAL: ICD-10-CM

## 2024-02-21 DIAGNOSIS — J45.21 MILD INTERMITTENT ASTHMA WITH EXACERBATION: ICD-10-CM

## 2024-02-21 DIAGNOSIS — U07.1 COVID: Primary | ICD-10-CM

## 2024-02-21 NOTE — PROGRESS NOTES
Carmencita Tripathi (:  1980) is a 43 y.o. female,Established patient, here for evaluation of the following chief complaint(s):  Positive For Covid-19 (Pos for Covid on  and pos for strep / now feeling like heavy feeling on chest from chest congestion and coughing, difficulty breathing wants to rule out pneumonia )      ASSESSMENT/PLAN:  Visit Diagnoses and Associated Orders       COVID    -  Primary    XR CHEST PA/LAT [60250 CPT(R)]   - Future Order         Mild intermittent asthma with exacerbation        predniSONE 5 MG (21) TBPK [234999]           Acute dysfunction of Eustachian tube, bilateral                        VSS, afebrile, lungs with faint wheezing to RUL, no rales/rhonchi/diminished breath sounds. CXR without acute cardiopulmonary abnormality. As patient has continued wheezing, will taper prednisone burst with low dose prednisone dose pack. To continue albuterol PRN and as previously directed for wheezing or chest tightness. Bilateral eustachian tube dysfunction, no evidence of OM or OE. To start flonase once daily. To monitor symptoms and follow-up if symptoms worsen or do not improve on current treatment plan.       Follow up in PRN days if symptoms persist or if symptoms worsen.    SUBJECTIVE/OBJECTIVE:  HPI  HPI:   43 y.o. female presents with symptoms of chest heaviness, chest congestion, productive cough with green, difficulty breathing, rhinitis, L ear pain x 5 days. She tested positive for COVID-19 on  and positive for strep on . She is currently on cefdinir for strep throat and prednisone burst for her body aches. Rates ear pain as 7/10 in severity, aching in nature, does not radiate. Using motrin, childrens mucinex with mild improvement in symptoms. Nothing makes symptoms worse. Denies sick contacts. Denies fever/chills/sweats/body aches, CP, nasal congestion, HA, sinus pain, N/V/D, abdominal pain, swollen glands, rash.            Vitals:    24 1232   BP: 108/70

## 2024-02-22 RX ORDER — PREDNISONE 5 MG/1
TABLET ORAL
Qty: 1 EACH | Refills: 0 | Status: SHIPPED | OUTPATIENT
Start: 2024-02-22

## 2024-02-23 ENCOUNTER — ENROLLMENT (OUTPATIENT)
Facility: HOSPITAL | Age: 44
End: 2024-02-23

## 2024-02-25 PROBLEM — I47.10 SUPRAVENTRICULAR TACHYCARDIA: Status: ACTIVE | Noted: 2024-02-25

## 2024-02-25 PROBLEM — M32.8 OTHER FORMS OF SYSTEMIC LUPUS ERYTHEMATOSUS (HCC): Status: ACTIVE | Noted: 2018-10-17

## 2024-02-25 PROBLEM — H81.10 BENIGN POSITIONAL VERTIGO: Status: ACTIVE | Noted: 2020-02-08

## 2024-02-25 PROBLEM — I34.1 MVP (MITRAL VALVE PROLAPSE): Status: ACTIVE | Noted: 2024-02-25

## 2024-02-25 PROBLEM — R73.03 PREDIABETES: Status: ACTIVE | Noted: 2019-03-09

## 2024-02-25 PROBLEM — L40.50 PSORIATIC ARTHRITIS (HCC): Status: ACTIVE | Noted: 2018-10-17

## 2024-02-26 ENCOUNTER — TELEPHONE (OUTPATIENT)
Facility: HOSPITAL | Age: 44
End: 2024-02-26

## 2024-02-26 ENCOUNTER — PHARMACY VISIT (OUTPATIENT)
Facility: HOSPITAL | Age: 44
End: 2024-02-26

## 2024-02-26 DIAGNOSIS — L40.50 PSORIATIC ARTHRITIS (HCC): Primary | ICD-10-CM

## 2024-02-26 RX ORDER — CHOLECALCIFEROL (VITAMIN D3) 125 MCG
500 CAPSULE ORAL DAILY
COMMUNITY

## 2024-02-26 RX ORDER — SENNOSIDES 8.6 MG
TABLET ORAL
COMMUNITY
Start: 2024-02-04 | End: 2024-02-26

## 2024-02-26 RX ORDER — TEA TREE OIL 100 %
OIL (ML) TOPICAL
COMMUNITY

## 2024-02-26 ASSESSMENT — PROMIS GLOBAL HEALTH SCALE
IN GENERAL, WOULD YOU SAY YOUR HEALTH IS...[ON A SCALE OF 1 (POOR) TO 5 (EXCELLENT)]: 3
SUM OF RESPONSES TO QUESTIONS 3, 6, 7, & 8: 12
TO WHAT EXTENT ARE YOU ABLE TO CARRY OUT YOUR EVERYDAY PHYSICAL ACTIVITIES SUCH AS WALKING, CLIMBING STAIRS, CARRYING GROCERIES, OR MOVING A CHAIR [ON A SCALE OF 1 (NOT AT ALL) TO 5 (COMPLETELY)]?: 2
IN GENERAL, WOULD YOU SAY YOUR QUALITY OF LIFE IS...[ON A SCALE OF 1 (POOR) TO 5 (EXCELLENT)]: 3
IN THE PAST 7 DAYS, HOW WOULD YOU RATE YOUR FATIGUE ON AVERAGE [ON A SCALE FROM 1 (NONE) TO 5 (VERY SEVERE)]?: 2
IN THE PAST 7 DAYS, HOW WOULD YOU RATE YOUR PAIN ON AVERAGE [ON A SCALE FROM 0 (NO PAIN) TO 10 (WORST IMAGINABLE PAIN)]?: 5
SUM OF RESPONSES TO QUESTIONS 2, 4, 5, & 10: 14
IN GENERAL, HOW WOULD YOU RATE YOUR MENTAL HEALTH, INCLUDING YOUR MOOD AND YOUR ABILITY TO THINK [ON A SCALE OF 1 (POOR) TO 5 (EXCELLENT)]?: 3
IN THE PAST 7 DAYS, HOW OFTEN HAVE YOU BEEN BOTHERED BY EMOTIONAL PROBLEMS, SUCH AS FEELING ANXIOUS, DEPRESSED, OR IRRITABLE [ON A SCALE FROM 1 (NEVER) TO 5 (ALWAYS)]?: 5
IN GENERAL, HOW WOULD YOU RATE YOUR SATISFACTION WITH YOUR SOCIAL ACTIVITIES AND RELATIONSHIPS [ON A SCALE OF 1 (POOR) TO 5 (EXCELLENT)]?: 3
IN GENERAL, HOW WOULD YOU RATE YOUR PHYSICAL HEALTH [ON A SCALE OF 1 (POOR) TO 5 (EXCELLENT)]?: 3
IN GENERAL, PLEASE RATE HOW WELL YOU CARRY OUT YOUR USUAL SOCIAL ACTIVITIES (INCLUDES ACTIVITIES AT HOME, AT WORK, AND IN YOUR COMMUNITY, AND RESPONSIBILITIES AS A PARENT, CHILD, SPOUSE, EMPLOYEE, FRIEND, ETC) [ON A SCALE OF 1 (POOR) TO 5 (EXCELLENT)]?: 3

## 2024-02-26 NOTE — PROGRESS NOTES
Specialty Medication Service    Patient's Name: Carmencita Tripathi YOB: 1980      Carmencita Tripathi is a 43 y.o. female presenting today for Specialty Medication Service visit. Patient is prescribed [employer] formulary medication, Skyrizi. Medication list updated.    Specialty Medication: Skyrizi 150mg/ml syringe   Frequency: every 12 weeks following induction dosing.   Indication: Psoriatic arthritis  L40.50  Initially Diagnosed: Ag 15  Additional Therapy:   None  Previous Therapy:   Enbrel (ineffective, stopped 2/2021)  Remicaide (hives)  Otezla (ineffective, gastritis)  Taltz (self discontinued)  Cosentyx (tonsillitis, ulcers)  Xeljanz (gastritis, anaphylaxis per patient - 09/18)  Stelara (3/2019)  Tremfya (4/2021 - weljaimee)  Hydroxychloroquine (started 09/2022)    Specialist:   Benigno Jones MD  [facility]   [address] [place] [address]  Phone: [phone]  Fax: [phone]  Specialist Progress Note Available: Yes, scanned in Media tab  Last Specialist Visit: 05/04/2023:  PsA follow-up. Currently experiencing symptoms of flares of increased joint pain, swelling, stiffness upper back and neck, elbows, knees, psoriasis on ears, left elbow, face rash, fatigue, abdominal pain, heaaches. Did miss a month of Tremfya, last dose 23/29/2023., so this may explain flair. Asks if Tremfya will no longer be effective.   Plan: continue Tremfya, suspect flare will resolve and re-capture control of PsA she had with Tremfya. Ultimately, could consider Rinvoq.     Allergies   Allergen Reactions    Latex Other (See Comments)     Blisters      Tofacitinib Anaphylaxis    Adalimumab Hives    Aspirin Hives     Other reaction(s): Unknown (comments)    Azithromycin Hives     Other reaction(s): Unknown (comments)    Infliximab Hives    Iodine Hives     Other reaction(s): Unknown (comments)    Macrobid [Nitrofurantoin] Hives    Penicillins Hives     Other reaction(s): Unknown

## 2024-02-26 NOTE — TELEPHONE ENCOUNTER
Specialty Medication Service    Date: 2/26/2024  Patient's Name: Carmencita Tripathi YOB: 1980            _____________________________________________________________________________________________    Patient transferred from [facility] to schedule PharmD initial appointment for Specialty Medication Services. Patient scheduled 02/26/24 at 2 pm.  Called specialist office (Benigno Jones) to request office notes.      Notes received and added to media.    Sonal Gagnon CPhT  Pharmacy   Specialty Medication Services   Phone: [phone] option 4    For Pharmacy Admin Tracking Only    Program: SMS  CPA in place:  No  Recommendation Provided To: Provider: 1 via Called provider office and Patient/Caregiver: 1 via Telephone  Intervention Detail: Scheduled Appointment  Intervention Accepted By: Provider: 1 and Patient/Caregiver: 1  Gap Closed?:    Time Spent (min): 15

## 2024-02-27 ENCOUNTER — TELEPHONE (OUTPATIENT)
Facility: HOSPITAL | Age: 44
End: 2024-02-27

## 2024-02-27 RX ORDER — EPINEPHRINE 0.3 MG/.3ML
0.3 INJECTION SUBCUTANEOUS ONCE
COMMUNITY
Start: 2019-03-26

## 2024-02-27 RX ORDER — RISANKIZUMAB-RZAA 150 MG/ML
INJECTION SUBCUTANEOUS
COMMUNITY

## 2024-02-27 NOTE — TELEPHONE ENCOUNTER
Specialty Medication Service    Date: 2/27/2024  Patient's Name: Carmencita Tripathi YOB: 1980            _____________________________________________________________________________________________    Reached out to patient provider regarding a couple concerns that arose during patient initial assessment on Skyrizi yesterday. Patient mentions she has had two doses of Skyrizi thus far and had no problems with the first dose. However, the day after her second dose she broke out in full body hives (no anaphylaxis). States that same day she had also taken Macrobid for the first time ever in her life to treat a UTI. Patient has significant history of drug allergies to antibiotics and some biologics. Does mention welts from Tremfya previously, which is also a IL-23 Inhibitor. I do believe her reaction was likely from the Macrobid given the onset of the reaction (2 hours after Macrobid). However due to her previous Tremfya reaction and close timing of hives to Skyrizi dose, I have instructed patient not to proceed with her first home maintenance injection until I speak with her provider. Patient is agreeable and does mention also has an EpiPen at home.     Following our call yesterday, I spoke with provider nurse, Sierra and alerted her of my concerns. I likely think she should be fine based on my assumptions above, but I did recommend to the nurse maybe having patient do next injection in office.  Sierra states she will discuss with the provider and reach out to the patient. I will follow-up with the patient as well this week to assess.     Additionally, I asked Sierra about patient labs. Sierra provided me records of patient last TB labs (negative, 2022). Patient is also low risk based on risk assessment and have no TB concerns. Sierra does not have any records of hepatitis screening for the patient. Per [employer], patients should have baseline HepB/C labs performed prior to starting therapy. This

## 2024-03-05 ENCOUNTER — PHARMACY VISIT (OUTPATIENT)
Facility: HOSPITAL | Age: 44
End: 2024-03-05

## 2024-03-05 DIAGNOSIS — L40.50 PSORIATIC ARTHRITIS (HCC): Primary | ICD-10-CM

## 2024-03-05 NOTE — PROGRESS NOTES
Initial Specialty Medication Virtual Visit  [facility]  [address] [facility] [address]  Dept: [phone]  Loc: [phone]  Date of patient's visit: 3/5/2024  Patient's Name:  Carmencita Tripathi YOB: 1980            Patient Care Team:  Serafin Pepe MD as PCP - General  Shayna German MD as Consulting Physician (Cardiology)  ================================================================    REASON FOR VISIT/CHIEF COMPLAINT:  No chief complaint on file.    HISTORY OF PRESENTING ILLNESS:  Carmencita Tripathi is 43 y.o. is here for initial virtual visit for specialty medication.    Specialty Medication: Skyrizi  Frequency: q12 weeks  Indication: PsA and lupus  Initially Diagnosed:   Specialist: Dr. Jones  Last Specialist Visit: 2023 and follow up in March, 2024  Side effects includes: no   Current symptoms include: none  Carmencita Tripathi has no new complain today.   Recent blood work done on 2024 are normal.        DIAGNOSTIC FINDINGS:  CBC:  Lab Results   Component Value Date/Time    WBC 6.0 02/04/2024 11:00 AM    HGB 14.9 02/04/2024 11:00 AM     02/04/2024 11:00 AM       BMP:    Lab Results   Component Value Date/Time     02/04/2024 11:00 AM    K 4.4 02/04/2024 11:00 AM     02/04/2024 11:00 AM    CO2 21 02/04/2024 11:00 AM    BUN 11 02/04/2024 11:00 AM    CREATININE 0.97 02/04/2024 11:00 AM    GLUCOSE 98 02/04/2024 11:00 AM       HEMOGLOBIN A1C:   Lab Results   Component Value Date/Time    LABA1C 5.5 01/24/2023 03:51 AM       FASTING LIPID PANEL:No results found for: \"CHOL\", \"HDL\", \"TRIG\"    No results found for: \"SOLEDAD\"    No results found for: \"HAV\", \"HEPAIGM\", \"HEPBIGM\", \"HEPBCAB\", \"HBEAG\", \"HEPCAB\"        Patient Active Problem List   Diagnosis    Seizure-like activity (HCC)    Psoriasis    SVT (supraventricular tachycardia)    Seizure (HCC)    Psoriatic arthritis (HCC)    Prediabetes    Other forms of systemic lupus

## 2024-03-13 NOTE — TELEPHONE ENCOUNTER
Specialty Medication Service    Date: 3/13/2024  Patient's Name: Carmencita Tripathi YOB: 1980            _____________________________________________________________________________________________    Attempted to contact patient again today as well as provider to follow-up on patient concerns from SMS visit, but have not heard back. Sent patient a my chart message today as well. Will continue to reach out and will also message our medical director to see if this was discussed during their visit.     Jong Cummings, PharmD [facility]  Ambulatory Clinical Pharmacist  Specialty Medication Services  Phone: [phone] option #4  Fax: [phone]

## 2024-03-14 ENCOUNTER — TELEPHONE (OUTPATIENT)
Facility: HOSPITAL | Age: 44
End: 2024-03-14

## 2024-03-14 NOTE — TELEPHONE ENCOUNTER
Specialty Medication Service    Date: 3/14/2024  Patient's Name: Carmencita Tripathi YOB: 1980            _____________________________________________________________________________________________    Followed up with patient today to discuss if she gave her next injection of Skyrizi yet and if had any problems. Patient states she discussed the rash following skyrizi/macrobid with her provider and likely thought to be due to Macrobid. Patient states recent injection of Skyrizi was last week and has not had any further issues or concerns. Informed patient to reach out in the future if any further concerns. Patient is agreeable.     Additionally, spoke to provider office about hepatitis screening and nurse Esquivel states this is not something they routinely are checking with Skyrizi and will discuss with provider if necessary. At this time office states they will not be giving. Currently, patient LFTs are WNL and did not have any s/sx of hepatitis when I spoke with her nor history of infection or high risk behaviors Will continue to monitor patient for s/sx of hepatitis in future and review labs. No further action needed today.    Jong Cummings, PharmD [facility]  Ambulatory Clinical Pharmacist  Specialty Medication Services  Phone: [phone] option #4  Fax: [phone]    For Pharmacy Admin Tracking Only    Program: [place]  CPA in place:  Yes    Time Spent (min):  90

## 2024-03-28 ENCOUNTER — TELEPHONE (OUTPATIENT)
Age: 44
End: 2024-03-28

## 2024-03-28 ENCOUNTER — HOSPITAL ENCOUNTER (EMERGENCY)
Facility: HOSPITAL | Age: 44
Discharge: HOME | End: 2024-03-28
Attending: STUDENT IN AN ORGANIZED HEALTH CARE EDUCATION/TRAINING PROGRAM
Payer: COMMERCIAL

## 2024-03-28 ENCOUNTER — OFFICE VISIT (OUTPATIENT)
Age: 44
End: 2024-03-28

## 2024-03-28 VITALS
TEMPERATURE: 98.4 F | HEIGHT: 62 IN | OXYGEN SATURATION: 97 % | HEART RATE: 73 BPM | BODY MASS INDEX: 28.34 KG/M2 | SYSTOLIC BLOOD PRESSURE: 112 MMHG | WEIGHT: 154 LBS | DIASTOLIC BLOOD PRESSURE: 76 MMHG | RESPIRATION RATE: 20 BRPM

## 2024-03-28 VITALS
DIASTOLIC BLOOD PRESSURE: 67 MMHG | RESPIRATION RATE: 14 BRPM | OXYGEN SATURATION: 100 % | TEMPERATURE: 97.8 F | SYSTOLIC BLOOD PRESSURE: 113 MMHG | HEART RATE: 74 BPM

## 2024-03-28 DIAGNOSIS — M94.0 COSTOCHONDRITIS: Primary | ICD-10-CM

## 2024-03-28 DIAGNOSIS — R07.9 CHEST PAIN, UNSPECIFIED TYPE: ICD-10-CM

## 2024-03-28 DIAGNOSIS — R07.9 NONSPECIFIC CHEST PAIN: Primary | ICD-10-CM

## 2024-03-28 LAB
ALBUMIN SERPL-MCNC: 3.5 G/DL (ref 3.5–5)
ALBUMIN/GLOB SERPL: 1 (ref 1.1–2.2)
ALP SERPL-CCNC: 107 U/L (ref 45–117)
ALT SERPL-CCNC: 23 U/L (ref 12–78)
ANION GAP SERPL CALC-SCNC: 7 MMOL/L (ref 5–15)
AST SERPL-CCNC: 13 U/L (ref 15–37)
BASOPHILS # BLD: 0.1 K/UL (ref 0–0.1)
BASOPHILS NFR BLD: 1 % (ref 0–1)
BILIRUB SERPL-MCNC: 0.3 MG/DL (ref 0.2–1)
BUN SERPL-MCNC: 7 MG/DL (ref 6–20)
BUN/CREAT SERPL: 9 (ref 12–20)
CALCIUM SERPL-MCNC: 8.8 MG/DL (ref 8.5–10.1)
CHLORIDE SERPL-SCNC: 106 MMOL/L (ref 97–108)
CO2 SERPL-SCNC: 24 MMOL/L (ref 21–32)
COMMENT:: NORMAL
CREAT SERPL-MCNC: 0.74 MG/DL (ref 0.55–1.02)
D DIMER PPP FEU-MCNC: 0.21 MG/L FEU (ref 0–0.65)
DIFFERENTIAL METHOD BLD: ABNORMAL
EKG ATRIAL RATE: 78 BPM
EKG DIAGNOSIS: NORMAL
EKG P AXIS: 58 DEGREES
EKG P-R INTERVAL: 124 MS
EKG Q-T INTERVAL: 358 MS
EKG QRS DURATION: 74 MS
EKG QTC CALCULATION (BAZETT): 408 MS
EKG R AXIS: 64 DEGREES
EKG T AXIS: 44 DEGREES
EKG VENTRICULAR RATE: 78 BPM
EOSINOPHIL # BLD: 0.1 K/UL (ref 0–0.4)
EOSINOPHIL NFR BLD: 1 % (ref 0–7)
ERYTHROCYTE [DISTWIDTH] IN BLOOD BY AUTOMATED COUNT: 13.6 % (ref 11.5–14.5)
GLOBULIN SER CALC-MCNC: 3.4 G/DL (ref 2–4)
GLUCOSE SERPL-MCNC: 92 MG/DL (ref 65–100)
HCT VFR BLD AUTO: 42.7 % (ref 35–47)
HGB BLD-MCNC: 14.3 G/DL (ref 11.5–16)
IMM GRANULOCYTES # BLD AUTO: 0.1 K/UL (ref 0–0.04)
IMM GRANULOCYTES NFR BLD AUTO: 1 % (ref 0–0.5)
LYMPHOCYTES # BLD: 2.2 K/UL (ref 0.8–3.5)
LYMPHOCYTES NFR BLD: 29 % (ref 12–49)
MCH RBC QN AUTO: 28.4 PG (ref 26–34)
MCHC RBC AUTO-ENTMCNC: 33.5 G/DL (ref 30–36.5)
MCV RBC AUTO: 84.9 FL (ref 80–99)
MONOCYTES # BLD: 0.7 K/UL (ref 0–1)
MONOCYTES NFR BLD: 9 % (ref 5–13)
NEUTS SEG # BLD: 4.5 K/UL (ref 1.8–8)
NEUTS SEG NFR BLD: 59 % (ref 32–75)
NRBC # BLD: 0 K/UL (ref 0–0.01)
NRBC BLD-RTO: 0 PER 100 WBC
PLATELET # BLD AUTO: 425 K/UL (ref 150–400)
PMV BLD AUTO: 9.8 FL (ref 8.9–12.9)
POTASSIUM SERPL-SCNC: 4.2 MMOL/L (ref 3.5–5.1)
PROT SERPL-MCNC: 6.9 G/DL (ref 6.4–8.2)
RBC # BLD AUTO: 5.03 M/UL (ref 3.8–5.2)
SODIUM SERPL-SCNC: 137 MMOL/L (ref 136–145)
SPECIMEN HOLD: NORMAL
TROPONIN I SERPL HS-MCNC: <4 NG/L (ref 0–51)
WBC # BLD AUTO: 7.5 K/UL (ref 3.6–11)

## 2024-03-28 PROCEDURE — 93005 ELECTROCARDIOGRAM TRACING: CPT | Performed by: STUDENT IN AN ORGANIZED HEALTH CARE EDUCATION/TRAINING PROGRAM

## 2024-03-28 PROCEDURE — 36415 COLL VENOUS BLD VENIPUNCTURE: CPT

## 2024-03-28 PROCEDURE — 93010 ELECTROCARDIOGRAM REPORT: CPT | Performed by: INTERNAL MEDICINE

## 2024-03-28 PROCEDURE — 85025 COMPLETE CBC W/AUTO DIFF WBC: CPT

## 2024-03-28 PROCEDURE — 84484 ASSAY OF TROPONIN QUANT: CPT

## 2024-03-28 PROCEDURE — 80053 COMPREHEN METABOLIC PANEL: CPT

## 2024-03-28 PROCEDURE — 99284 EMERGENCY DEPT VISIT MOD MDM: CPT

## 2024-03-28 PROCEDURE — 85379 FIBRIN DEGRADATION QUANT: CPT

## 2024-03-28 RX ORDER — LIDOCAINE 4 G/G
1 PATCH TOPICAL DAILY
Qty: 30 PATCH | Refills: 0 | Status: SHIPPED | OUTPATIENT
Start: 2024-03-28 | End: 2024-04-27

## 2024-03-28 RX ORDER — KETOROLAC TROMETHAMINE 30 MG/ML
30 INJECTION, SOLUTION INTRAMUSCULAR; INTRAVENOUS ONCE
Status: COMPLETED | OUTPATIENT
Start: 2024-03-28 | End: 2024-03-28

## 2024-03-28 RX ORDER — OMEPRAZOLE 20 MG/1
40 CAPSULE, DELAYED RELEASE ORAL DAILY
Qty: 60 CAPSULE | Refills: 0 | Status: SHIPPED | OUTPATIENT
Start: 2024-03-28 | End: 2024-04-27

## 2024-03-28 RX ADMIN — KETOROLAC TROMETHAMINE 30 MG: 30 INJECTION, SOLUTION INTRAMUSCULAR; INTRAVENOUS at 09:40

## 2024-03-28 ASSESSMENT — LIFESTYLE VARIABLES
HOW OFTEN DO YOU HAVE A DRINK CONTAINING ALCOHOL: NEVER
HOW MANY STANDARD DRINKS CONTAINING ALCOHOL DO YOU HAVE ON A TYPICAL DAY: PATIENT DOES NOT DRINK

## 2024-03-28 ASSESSMENT — PAIN DESCRIPTION - LOCATION: LOCATION: CHEST

## 2024-03-28 ASSESSMENT — ENCOUNTER SYMPTOMS
ALLERGIC/IMMUNOLOGIC NEGATIVE: 1
GASTROINTESTINAL NEGATIVE: 1
EYES NEGATIVE: 1
RESPIRATORY NEGATIVE: 1

## 2024-03-28 ASSESSMENT — PAIN - FUNCTIONAL ASSESSMENT: PAIN_FUNCTIONAL_ASSESSMENT: 0-10

## 2024-03-28 ASSESSMENT — PAIN SCALES - GENERAL: PAINLEVEL_OUTOF10: 6

## 2024-03-28 NOTE — TELEPHONE ENCOUNTER
Mini from [facility] called,  Pt is also employee.  Started not feeling well so they did a workup on her.    C/o left sided chest Pain that came on suddenly; internal pain; unable to palpate.  Denies N/V/SOB,h/a, dizziness.    Gave Toradol, pain still there, still sharp.    Did EKG; nothing worrisome (scanned in cardiology tab).    Encouraged them to head to ED for further evaluation as they are unable to draw urgent labs there.    Confirmed NOV as below, if need sooner apt, may be able to get her in sooner.  Future Appointments   Date Time Provider Department Center   4/15/2024  4:00 PM Shayna German MD [facility]

## 2024-03-28 NOTE — PROGRESS NOTES
An electronic signature was used to authenticate this note.  -- Nena Humphries, APRN - NP

## 2024-03-28 NOTE — DISCHARGE INSTRUCTIONS
Testing in the emergency department was reassuring.  There was no evidence of a heart attack or blood clot or other medical emergency.  Take over-the-counter pain medications as directed, return to the emergency department if symptoms change or worsen or you have any other concerns.  Otherwise follow-up with your primary doctor soon as possible for reevaluation.

## 2024-03-28 NOTE — ED TRIAGE NOTES
Patient arrived ambulatory via POV with c/c of chest pain that started this morning.     Patient seen at urgent care this morning and completed chest xray and given IM Toradol

## 2024-03-28 NOTE — ED NOTES
Pt was discharged by Dr So Rebolledo  Pt verbalized good understanding of all discharge instructions, prescriptions, and follow up care.   All questions answered.  Pt in stable condition on discharge.    PICC line infiltration

## 2024-03-29 NOTE — ED PROVIDER NOTES
HENT:      Head: Normocephalic and atraumatic.      Nose: Nose normal.      Mouth/Throat:      Mouth: Mucous membranes are moist.   Eyes:      General: No scleral icterus.     Extraocular Movements: Extraocular movements intact.   Cardiovascular:      Rate and Rhythm: Normal rate.      Pulses: Normal pulses.   Pulmonary:      Effort: Pulmonary effort is normal.      Breath sounds: Normal breath sounds.   Abdominal:      General: Abdomen is flat.   Musculoskeletal:         General: No deformity or signs of injury.      Cervical back: Normal range of motion and neck supple.   Skin:     General: Skin is warm.   Neurological:      General: No focal deficit present.      Mental Status: She is alert.   Psychiatric:         Mood and Affect: Mood normal.         DIAGNOSTIC RESULTS   EKG: If obtained, EKG interpretation provided in the ED course    RADIOLOGY:   No orders to display        LABS:  Labs Reviewed   CBC WITH AUTO DIFFERENTIAL - Abnormal; Notable for the following components:       Result Value    Platelets 425 (*)     Immature Granulocytes 1 (*)     Absolute Immature Granulocyte 0.1 (*)     All other components within normal limits   COMPREHENSIVE METABOLIC PANEL - Abnormal; Notable for the following components:    Bun/Cre Ratio 9 (*)     AST 13 (*)     Albumin/Globulin Ratio 1.0 (*)     All other components within normal limits   EXTRA TUBES HOLD   TROPONIN   D-DIMER, QUANTITATIVE   D-DIMER, QUANTITATIVE       All other labs were within normal range or not returned as of this dictation.    EMERGENCY DEPARTMENT COURSE and DIFFERENTIAL DIAGNOSIS/MDM:   Vitals:    Vitals:    03/28/24 1224 03/28/24 1225   BP: 113/67    Pulse:  74   Resp:  14   Temp:  97.8 °F (36.6 °C)   TempSrc:  Oral   SpO2:  100%       Medical Decision Making  43-year-old female with several hours of intermittent nonspecific chest pain.  Vitals are normal, patient is well-appearing.  HEART score 1.  Cannot be ruled out by PERC criteria given

## 2024-04-08 NOTE — TELEPHONE ENCOUNTER
Problem: Adult Inpatient Plan of Care  Goal: Plan of Care Review  Outcome: Ongoing, Progressing  Goal: Patient-Specific Goal (Individualized)  Outcome: Ongoing, Progressing  Goal: Absence of Hospital-Acquired Illness or Injury  Outcome: Ongoing, Progressing  Goal: Optimal Comfort and Wellbeing  Outcome: Ongoing, Progressing  Goal: Readiness for Transition of Care  Outcome: Ongoing, Progressing     Problem: Infection  Goal: Absence of Infection Signs and Symptoms  Outcome: Ongoing, Progressing     Problem: Pain Acute  Goal: Acceptable Pain Control and Functional Ability  Outcome: Ongoing, Progressing     Problem: Fall Injury Risk  Goal: Absence of Fall and Fall-Related Injury  Outcome: Ongoing, Progressing     Problem: COPD (Chronic Obstructive Pulmonary Disease) Comorbidity  Goal: Maintenance of COPD Symptom Control  Outcome: Ongoing, Progressing     Problem: Hypertension Comorbidity  Goal: Blood Pressure in Desired Range  Outcome: Ongoing, Progressing     Problem: Obstructive Sleep Apnea Risk or Actual Comorbidity Management  Goal: Unobstructed Breathing During Sleep  Outcome: Ongoing, Progressing     Problem: Adjustment to Surgery (Shoulder Arthroplasty)  Goal: Optimal Coping  Outcome: Ongoing, Progressing     Problem: Bleeding (Shoulder Arthroplasty)  Goal: Absence of Bleeding  Outcome: Ongoing, Progressing     Problem: Bowel Motility Impaired (Shoulder Arthroplasty)  Goal: Effective Bowel Elimination  Outcome: Ongoing, Progressing     Problem: Fluid and Electrolyte Imbalance (Shoulder Arthroplasty)  Goal: Fluid and Electrolyte Balance  Outcome: Ongoing, Progressing     Problem: Functional Ability Impaired (Shoulder Arthroplasty)  Goal: Optimal Functional Ability  Outcome: Ongoing, Progressing     Problem: Infection (Shoulder Arthroplasty)  Goal: Absence of Infection Signs and Symptoms  Outcome: Ongoing, Progressing     Problem: Neurovascular Compromise (Shoulder Arthroplasty)  Goal: Intact Neurovascular  Patient was seen for chest pain, patient request a call back from nurse for advice.     Patient # [phone]   Status  Outcome: Ongoing, Progressing     Problem: Ongoing Anesthesia Effects (Shoulder Arthroplasty)  Goal: Anesthesia/Sedation Recovery  Outcome: Ongoing, Progressing     Problem: Pain (Shoulder Arthroplasty)  Goal: Acceptable Pain Control  Outcome: Ongoing, Progressing     Problem: Postoperative Nausea and Vomiting (Shoulder Arthroplasty)  Goal: Nausea and Vomiting Relief  Outcome: Ongoing, Progressing     Problem: Postoperative Urinary Retention (Shoulder Arthroplasty)  Goal: Effective Urinary Elimination  Outcome: Ongoing, Progressing     Problem: Behavioral Health Comorbidity  Goal: Maintenance of Behavioral Health Symptom Control  Outcome: Ongoing, Progressing     Problem: Diabetes Comorbidity  Goal: Blood Glucose Level Within Targeted Range  Outcome: Ongoing, Progressing

## 2024-04-15 ENCOUNTER — OFFICE VISIT (OUTPATIENT)
Age: 44
End: 2024-04-15
Payer: COMMERCIAL

## 2024-04-15 VITALS
HEART RATE: 82 BPM | RESPIRATION RATE: 16 BRPM | SYSTOLIC BLOOD PRESSURE: 100 MMHG | WEIGHT: 155.2 LBS | OXYGEN SATURATION: 98 % | HEIGHT: 61 IN | BODY MASS INDEX: 29.3 KG/M2 | DIASTOLIC BLOOD PRESSURE: 58 MMHG

## 2024-04-15 DIAGNOSIS — I47.10 SVT (SUPRAVENTRICULAR TACHYCARDIA) (HCC): Primary | ICD-10-CM

## 2024-04-15 DIAGNOSIS — R07.9 CHEST PAIN, UNSPECIFIED TYPE: ICD-10-CM

## 2024-04-15 DIAGNOSIS — R00.2 PALPITATIONS: ICD-10-CM

## 2024-04-15 PROCEDURE — 99214 OFFICE O/P EST MOD 30 MIN: CPT | Performed by: SPECIALIST

## 2024-04-15 NOTE — PROGRESS NOTES
had concerns including Dizziness and Other (SVT).    Vitals:    04/15/24 1531   BP: (!) 100/58   Site: Right Upper Arm   Position: Sitting   Pulse: 82   Resp: 16   SpO2: 98%   Weight: 70.4 kg (155 lb 3.2 oz)   Height: 1.562 m (5' 1.5\")        Chest pain No    Refills No        1. Have you been to the ER, urgent care clinic since your last visit? No       Hospitalized since your last visit? No       Where?  [facility]  3/28/24    Date?  
with CHF)        3) Dizziness    - she has random spells of dizziness -- HR can be fast or normal during spells  - lasts 5-10 min    - waking in AM and moving fast can cause dizziness    - Saw Neurology (Dr. Sheth) -- she says workup was OK    - Referred to ENT for dizziness in past    - orthostatic vitals 2/23 and 10/23 looked OK   - Autonomic testing 11/27/23 - Relatively preserved autonomic function for this age, despite patient reported lightheadedness during head up tilt.   - Asked her to take in adequate salt and fluids   - Can cut back coreg later (she prefers current dose for now)      4) Anxiety / Panic attacks   - Sees a therapist   - On Lexapro   - these therapies have helped symptoms     5) See NP in 6 months.   Check an echo.      Used to live in [place].  Works at [employer] --- is to work [employer] on [address].  Son is studying Criminal Psychology       Shayna German MD, [facility] [place]  [facility]  [address], [address]  [address]  [place], [place]  [address]  [place] [address]  Ph: [phone]   Ph [phone]

## 2024-05-08 ENCOUNTER — TELEPHONE (OUTPATIENT)
Facility: HOSPITAL | Age: 44
End: 2024-05-08

## 2024-05-08 NOTE — TELEPHONE ENCOUNTER
Specialty Medication Service    Date: 5/8/2024  Patient's Name: Carmencita Tripathi YOB: 1980            _____________________________________________________________________________________________    Patient no longer has [employer] benefits (termed 4/30/24). Patient is no longer enrolled in SMS program. Left message to explain they will no longer be eligible for SMS services and that we will be discharging them from the program.  Informed patient future SMS appointments will be canceled and no further outreach planned.  Patient instructed to contact [facility] ([phone]) to provide updated insurance information for continuity in care if possible.  and Patient instructed to call [facility] immediately ([phone] option 4) if they are still taking their specialty medication or if they restart therapy in the future.       Mary Donato CPhT  Clinical    Specialty Medication Services  Phone: [phone] option #4  Fax: [phone]    For Pharmacy Admin Tracking Only    Program: [employer]    Time Spent (min): 15

## 2024-07-10 ENCOUNTER — TELEPHONE (OUTPATIENT)
Age: 44
End: 2024-07-10

## 2024-07-10 NOTE — TELEPHONE ENCOUNTER
R/t call to patient    Patient been experiencing palpitations and some tachycardia.    Patient works as a - HR between 110-115 (reaches as high as 130-140)    BP- 123/85    NO CP/SOB  Has some lightheadedness/dizziness    Takes Carvedilol 12.5 mg BID  Stopped the metoprolol    Any recommendations?

## 2024-07-10 NOTE — TELEPHONE ENCOUNTER
R/t call to patient- Patient advised of following. Message sent to [employer] to have monitor mailed out to her.    Per MARILIN Henry NP: \"  We can send her 2 week monitor for tachycardia, palpitations, history of SVT.  Thanks  Suzanne

## 2024-07-10 NOTE — TELEPHONE ENCOUNTER
Patient states she has been experiencing elevated heart rate and its concerning her because she has been taking her medication as listed. She says she is a  and she doesn't move around a lot during the day and she says it has a hard time stay under 100 and the highest is in the 130s and 140s.    Phone [phone]

## 2024-07-10 NOTE — TELEPHONE ENCOUNTER
----- Message from Jenae Merino LPN sent at 7/10/2024 10:22 AM EDT -----  Patient needs a 2 week monitor mailed to her.  DX- tachycardia, palpitations and Hx of SVT.    FYI- patient has reaction to tape.      Jenae ZEPEDA LPN

## 2024-07-15 ENCOUNTER — PATIENT MESSAGE (OUTPATIENT)
Age: 44
End: 2024-07-15

## 2024-07-15 ENCOUNTER — TELEPHONE (OUTPATIENT)
Age: 44
End: 2024-07-15

## 2024-07-15 NOTE — TELEPHONE ENCOUNTER
Patient called in stating that she received the heart monitor but not the cream to keep her from breaking out . She is wanting to know that can be sent to her or if she pick it from the store. Patient also stated that she is going to [place] on Wednesday. She wants to know if she is needing an note or anything to be able to get onto the plane with her heart monitor. Patient is requesting to please have an call back asap.       Patient #~ [phone]

## 2024-09-19 ENCOUNTER — TELEMEDICINE (OUTPATIENT)
Age: 44
End: 2024-09-19

## 2024-09-19 ENCOUNTER — TELEPHONE (OUTPATIENT)
Age: 44
End: 2024-09-19

## 2024-09-19 DIAGNOSIS — G43.109 MIGRAINE WITH AURA AND WITHOUT STATUS MIGRAINOSUS, NOT INTRACTABLE: Primary | ICD-10-CM

## 2024-09-19 DIAGNOSIS — R56.1 SEIZURE AFTER HEAD INJURY (HCC): ICD-10-CM

## 2024-09-19 RX ORDER — UBROGEPANT 50 MG/1
50 TABLET ORAL PRN
Qty: 16 TABLET | Refills: 1 | Status: SHIPPED | OUTPATIENT
Start: 2024-09-19

## 2024-09-19 RX ORDER — MULTIVIT-MIN/IRON/FOLIC ACID/K 18-600-40
CAPSULE ORAL
COMMUNITY

## 2024-09-25 ENCOUNTER — TELEPHONE (OUTPATIENT)
Age: 44
End: 2024-09-25

## 2024-09-25 NOTE — TELEPHONE ENCOUNTER
Can you call patient and get her scheduled for a routine EEG and sleep deprived please     Also needs a follow up (virtually with Flor) once those are scheduled please

## 2024-09-30 ENCOUNTER — APPOINTMENT (OUTPATIENT)
Facility: HOSPITAL | Age: 44
End: 2024-09-30
Payer: COMMERCIAL

## 2024-09-30 ENCOUNTER — HOSPITAL ENCOUNTER (EMERGENCY)
Facility: HOSPITAL | Age: 44
Discharge: HOME | End: 2024-09-30
Attending: EMERGENCY MEDICINE
Payer: COMMERCIAL

## 2024-09-30 VITALS
RESPIRATION RATE: 14 BRPM | HEIGHT: 62 IN | BODY MASS INDEX: 29.08 KG/M2 | HEART RATE: 76 BPM | SYSTOLIC BLOOD PRESSURE: 121 MMHG | TEMPERATURE: 98.6 F | DIASTOLIC BLOOD PRESSURE: 70 MMHG | OXYGEN SATURATION: 98 % | WEIGHT: 158 LBS

## 2024-09-30 DIAGNOSIS — R55 NEAR SYNCOPE: ICD-10-CM

## 2024-09-30 DIAGNOSIS — R07.9 CHEST PAIN, UNSPECIFIED TYPE: Primary | ICD-10-CM

## 2024-09-30 LAB
ALBUMIN SERPL-MCNC: 3.8 G/DL (ref 3.5–5)
ALBUMIN/GLOB SERPL: 1.1 (ref 1.1–2.2)
ALP SERPL-CCNC: 142 U/L (ref 45–117)
ALT SERPL-CCNC: 19 U/L (ref 12–78)
ANION GAP SERPL CALC-SCNC: 10 MMOL/L (ref 2–12)
AST SERPL-CCNC: 13 U/L (ref 15–37)
BASOPHILS # BLD: 0.1 K/UL (ref 0–0.1)
BASOPHILS NFR BLD: 1 % (ref 0–1)
BILIRUB SERPL-MCNC: 0.2 MG/DL (ref 0.2–1)
BUN SERPL-MCNC: 5 MG/DL (ref 6–20)
BUN/CREAT SERPL: 7 (ref 12–20)
CALCIUM SERPL-MCNC: 9 MG/DL (ref 8.5–10.1)
CHLORIDE SERPL-SCNC: 104 MMOL/L (ref 97–108)
CO2 SERPL-SCNC: 25 MMOL/L (ref 21–32)
COMMENT:: NORMAL
CREAT SERPL-MCNC: 0.67 MG/DL (ref 0.55–1.02)
DIFFERENTIAL METHOD BLD: ABNORMAL
EOSINOPHIL # BLD: 0.1 K/UL (ref 0–0.4)
EOSINOPHIL NFR BLD: 1 % (ref 0–7)
ERYTHROCYTE [DISTWIDTH] IN BLOOD BY AUTOMATED COUNT: 12.3 % (ref 11.5–14.5)
GLOBULIN SER CALC-MCNC: 3.5 G/DL (ref 2–4)
GLUCOSE SERPL-MCNC: 97 MG/DL (ref 65–100)
HCG SERPL QL: NEGATIVE
HCT VFR BLD AUTO: 43.3 % (ref 35–47)
HGB BLD-MCNC: 14.8 G/DL (ref 11.5–16)
IMM GRANULOCYTES # BLD AUTO: 0 K/UL (ref 0–0.04)
IMM GRANULOCYTES NFR BLD AUTO: 0 % (ref 0–0.5)
LYMPHOCYTES # BLD: 2.7 K/UL (ref 0.8–3.5)
LYMPHOCYTES NFR BLD: 29 % (ref 12–49)
MCH RBC QN AUTO: 28.2 PG (ref 26–34)
MCHC RBC AUTO-ENTMCNC: 34.2 G/DL (ref 30–36.5)
MCV RBC AUTO: 82.5 FL (ref 80–99)
MONOCYTES # BLD: 0.8 K/UL (ref 0–1)
MONOCYTES NFR BLD: 8 % (ref 5–13)
NEUTS SEG # BLD: 5.8 K/UL (ref 1.8–8)
NEUTS SEG NFR BLD: 61 % (ref 32–75)
NRBC # BLD: 0 K/UL (ref 0–0.01)
NRBC BLD-RTO: 0 PER 100 WBC
PLATELET # BLD AUTO: 404 K/UL (ref 150–400)
PMV BLD AUTO: 9.8 FL (ref 8.9–12.9)
POTASSIUM SERPL-SCNC: 3.6 MMOL/L (ref 3.5–5.1)
PROT SERPL-MCNC: 7.3 G/DL (ref 6.4–8.2)
RBC # BLD AUTO: 5.25 M/UL (ref 3.8–5.2)
SODIUM SERPL-SCNC: 139 MMOL/L (ref 136–145)
SPECIMEN HOLD: NORMAL
TROPONIN I SERPL HS-MCNC: <4 NG/L (ref 0–51)
WBC # BLD AUTO: 9.4 K/UL (ref 3.6–11)

## 2024-09-30 PROCEDURE — 36415 COLL VENOUS BLD VENIPUNCTURE: CPT

## 2024-09-30 PROCEDURE — 2580000003 HC RX 258: Performed by: EMERGENCY MEDICINE

## 2024-09-30 PROCEDURE — 84484 ASSAY OF TROPONIN QUANT: CPT

## 2024-09-30 PROCEDURE — 99285 EMERGENCY DEPT VISIT HI MDM: CPT

## 2024-09-30 PROCEDURE — 80053 COMPREHEN METABOLIC PANEL: CPT

## 2024-09-30 PROCEDURE — 71045 X-RAY EXAM CHEST 1 VIEW: CPT

## 2024-09-30 PROCEDURE — 84703 CHORIONIC GONADOTROPIN ASSAY: CPT

## 2024-09-30 PROCEDURE — 93005 ELECTROCARDIOGRAM TRACING: CPT | Performed by: EMERGENCY MEDICINE

## 2024-09-30 PROCEDURE — 85025 COMPLETE CBC W/AUTO DIFF WBC: CPT

## 2024-09-30 RX ORDER — 0.9 % SODIUM CHLORIDE 0.9 %
1000 INTRAVENOUS SOLUTION INTRAVENOUS ONCE
Status: COMPLETED | OUTPATIENT
Start: 2024-09-30 | End: 2024-09-30

## 2024-09-30 RX ADMIN — SODIUM CHLORIDE 1000 ML: 9 INJECTION, SOLUTION INTRAVENOUS at 17:59

## 2024-09-30 ASSESSMENT — PAIN DESCRIPTION - DESCRIPTORS: DESCRIPTORS: PRESSURE

## 2024-09-30 ASSESSMENT — PAIN DESCRIPTION - LOCATION: LOCATION: CHEST

## 2024-09-30 ASSESSMENT — PAIN DESCRIPTION - ORIENTATION: ORIENTATION: LEFT

## 2024-09-30 ASSESSMENT — PAIN SCALES - GENERAL
PAINLEVEL_OUTOF10: 5
PAINLEVEL_OUTOF10: 0

## 2024-09-30 ASSESSMENT — LIFESTYLE VARIABLES: HOW OFTEN DO YOU HAVE A DRINK CONTAINING ALCOHOL: NEVER

## 2024-09-30 NOTE — ED PROVIDER NOTES
[facility] EMERGENCY DEPT  EMERGENCY DEPARTMENT ENCOUNTER      Pt Name: Carmencita Tripathi  MRN: [id number]  Birthdate 1980  Date of evaluation: 9/30/2024  Provider: Jerome Carmona MD      HISTORY OF PRESENT ILLNESS      HPI  44-year-old female with a past medical history of SVT presenting to the emergency department due to chest pain and left arm pain.  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  She said she started to get an abnormal sensation on the left side of her chest and her left arm.  She says it is more of a fluttering sensation than pain.  Her symptoms continued and then at some point she got very lightheaded and felt like she was going to pass out.  She says she was having difficulty thinking.  Coworkers called EMS.  Patient says she has had the symptoms multiple times in the past.  She has been seen in the emergency department multiple times for the symptoms.  She has been evaluated by cardiology and is currently getting worked up by neurology.  She says her symptoms have improved but she still feels a bit lightheaded.  Denies any shortness of breath.  No history of DVT or PE.      Nursing Notes were reviewed.    REVIEW OF SYSTEMS         Review of Systems  All systems reviewed were negative less otherwise document in the HPI      PAST MEDICAL HISTORY     Past Medical History:   Diagnosis Date    Anxiety     Psoriasis     Psoriatic arthritis (HCC)     SVT (supraventricular tachycardia) ([facility])          SURGICAL HISTORY     History reviewed. No pertinent surgical history.      CURRENT MEDICATIONS       Previous Medications    BACILLUS COAGULANS-INULIN (PROBIOTIC-PREBIOTIC) 1-250 BILLION-MG CAPS    Take by mouth Gummy daily    BUTALBITAL-ACETAMINOPHEN-CAFFEINE (FIORICET, ESGIC) -40 MG PER TABLET        CARVEDILOL (COREG) 12.5 MG TABLET    Take 1 tablet by mouth 2 times daily    CHOLECALCIFEROL (VITAMIN D) 50 MCG (2000 UT) CAPS CAPSULE    Take by

## 2024-09-30 NOTE — ED TRIAGE NOTES
Patient presents to treatment area via EMS.  Patient complains of left chest pain that radiates down left arm.  Patient states she was working when the pain began.  Not able to identify anything that alleviates the pain or makes it worse.  Denies shortness of breath.

## 2024-10-02 LAB
EKG ATRIAL RATE: 82 BPM
EKG DIAGNOSIS: NORMAL
EKG P AXIS: 35 DEGREES
EKG P-R INTERVAL: 138 MS
EKG Q-T INTERVAL: 348 MS
EKG QRS DURATION: 76 MS
EKG QTC CALCULATION (BAZETT): 406 MS
EKG R AXIS: 44 DEGREES
EKG T AXIS: 14 DEGREES
EKG VENTRICULAR RATE: 82 BPM

## 2024-10-02 PROCEDURE — 93010 ELECTROCARDIOGRAM REPORT: CPT | Performed by: SPECIALIST

## 2024-10-10 NOTE — PROGRESS NOTES
Patient: Carmencita Tripathi  : 1980    Primary Cardiologist: Shayna German MD. [facility]       Today's Date: 10/15/2024        HISTORY OF PRESENT ILLNESS:     History of Present Illness:  Went to ED on 24 with chest pain, left arm pain. Felt like fluttering sensation. EKG normal, troponin normal. Felt very overwhelmed at the time.   Feels lightheaded, dizziness at times.   Getting hot flashes. Working with PCP on panic attacks and recently started Wellbutrin. Seeing neurology for migraines.   Says BP has been in 90s with taking coreg BID so she has been taking it daily with improvements in BP and symptoms. Feels much better since recent ER visit.         PAST MEDICAL HISTORY:     Past Medical History:   Diagnosis Date    Anxiety     Psoriasis     Psoriatic arthritis (HCC)     SVT (supraventricular tachycardia) ([facility])            CURRENT MEDICATIONS:    .  Current Outpatient Medications   Medication Sig Dispense Refill    buPROPion (WELLBUTRIN XL) 150 MG extended release tablet Take 1 tablet by mouth every morning      carvedilol (COREG) 6.25 MG tablet Take 1 tablet by mouth 2 times daily 180 tablet 3    Cholecalciferol (VITAMIN D) 50 MCG ( UT) CAPS capsule Take by mouth      EPINEPHrine (EPIPEN) 0.3 MG/0.3ML SOAJ injection Inject 0.3 mLs into the muscle once      Risankizumab-rzaa (SKYRIZI) 150 MG/ML SOSY Inject 150 mg (1 syringe) under the skin every 12 weeks      vitamin B-12 (CYANOCOBALAMIN) 500 MCG tablet Take 1 tablet by mouth daily      butalbital-acetaminophen-caffeine (FIORICET, ESGIC) -40 MG per tablet       norethindrone-ethinyl estradiol-Fe (LO LOESTRIN FE) 1 MG-10 MCG / 10 MCG tablet Take 1 tablet by mouth daily      Ubrogepant (UBRELVY) 50 MG TABS Take 50 mg by mouth as needed (migraine) May repeat dose after two hour if needed. Max of two doses in 24 hours (Patient not taking: Reported on 2024) 16 tablet 1    Bacillus Coagulans-Inulin (PROBIOTIC-PREBIOTIC) 1-250 BILLION-MG

## 2024-10-15 ENCOUNTER — OFFICE VISIT (OUTPATIENT)
Age: 44
End: 2024-10-15
Payer: COMMERCIAL

## 2024-10-15 VITALS
HEART RATE: 83 BPM | OXYGEN SATURATION: 98 % | SYSTOLIC BLOOD PRESSURE: 116 MMHG | BODY MASS INDEX: 29.63 KG/M2 | HEIGHT: 62 IN | WEIGHT: 161 LBS | DIASTOLIC BLOOD PRESSURE: 70 MMHG

## 2024-10-15 DIAGNOSIS — R42 DIZZINESS AND GIDDINESS: ICD-10-CM

## 2024-10-15 DIAGNOSIS — I47.10 SVT (SUPRAVENTRICULAR TACHYCARDIA) (HCC): Primary | ICD-10-CM

## 2024-10-15 DIAGNOSIS — R00.2 PALPITATIONS: ICD-10-CM

## 2024-10-15 PROCEDURE — 99214 OFFICE O/P EST MOD 30 MIN: CPT

## 2024-10-15 RX ORDER — CARVEDILOL 6.25 MG/1
6.25 TABLET ORAL 2 TIMES DAILY
Qty: 180 TABLET | Refills: 3 | Status: SHIPPED | OUTPATIENT
Start: 2024-10-15

## 2024-10-15 RX ORDER — BUPROPION HYDROCHLORIDE 150 MG/1
150 TABLET ORAL EVERY MORNING
COMMUNITY
Start: 2024-09-03

## 2024-10-15 NOTE — PROGRESS NOTES
Chief Complaint   Patient presents with    Palpitations    Chest Pain     Vitals:    10/15/24 1511   BP: 116/70   Site: Left Upper Arm   Position: Sitting   Pulse: 83   SpO2: 98%   Weight: 73 kg (161 lb)   Height: 1.575 m (5' 2\")       Chest pain: DENIED     Recent hospital stays: DENIED     Refills: DENIED

## 2024-10-22 ENCOUNTER — TELEPHONE (OUTPATIENT)
Age: 44
End: 2024-10-22

## 2024-10-24 ENCOUNTER — PROCEDURE VISIT (OUTPATIENT)
Age: 44
End: 2024-10-24
Payer: COMMERCIAL

## 2024-10-24 ENCOUNTER — TELEPHONE (OUTPATIENT)
Age: 44
End: 2024-10-24

## 2024-10-24 DIAGNOSIS — R56.1 SEIZURE AFTER HEAD INJURY (HCC): Primary | ICD-10-CM

## 2024-10-24 PROCEDURE — 95816 EEG AWAKE AND DROWSY: CPT | Performed by: PSYCHIATRY & NEUROLOGY

## 2024-10-24 NOTE — TELEPHONE ENCOUNTER
Sent patient a [employer] message back in regards to her concerns.   Waiting response back from patient

## 2024-10-24 NOTE — TELEPHONE ENCOUNTER
Patient called on 10/22/2024 stating:     Patient requesting a call to discuss her current medical condition.     She stated on Sunday Night she think she had a seizure.     Please call to discuss.     She stated had appt today 10/22/2024 with her PCP      She also stated having brain fog, slurred speech, cold feeling.    She is here today 10/24/2024 for an EEG and is asking for a call back regarding her previous message since she hasn't heard from anyone.

## 2024-10-24 NOTE — TELEPHONE ENCOUNTER
Patient requesting a call to discuss her current medical condition.     She stated on Sunday Night she think she had a seizure.     Please call to discuss.     She stated had appt today 10/22/24 with her PCP      She also stated having brain fog, slurred speech, cold feeling.

## 2024-10-24 NOTE — TELEPHONE ENCOUNTER
Patient had also sent [employer] message in regards to paperwork that was faxed back today. Also asked about her current medical conditions and what her concerns were at this time?

## 2024-11-07 ENCOUNTER — TELEPHONE (OUTPATIENT)
Age: 44
End: 2024-11-07

## 2024-11-07 NOTE — TELEPHONE ENCOUNTER
Spoke with the patient and rescheduled her SDEEG to 1/31/2025 due to the tech being out on 11/8/2024. She wants to know the results of her Routine EEG that was done on 10/24/2024.

## 2024-11-08 NOTE — ED NOTES
Patient discharged by MD. Discharge papers signed, dated and timed/e-signature filed. Papers given and questions answered. Home with family.
Med Rec - Admission

## 2024-11-11 NOTE — PROCEDURES
Procedures      [facility]   Electroencephalogram Report    Procedure ID: [id number] Procedure Date: 10/24/2024   Patient Name: Carmencita Tripathi YOB: 1980   Procedure Type: Routine Medical Record No: [id number]       EEG is requested in this 44-year-old lady to evaluate for epileptiform abnormality.  Medication said to include Skyrizi, Coreg, Zofran, Fioricet    This tracing is obtained during the awake state.    During wakefulness, there are intermittent runs of posteriorly dominant and symmetric low to medium amplitude 9 cps activities which attenuate with eye opening.  Lower voltage faster frequency activities are seen symmetrically over the anterior head regions.    Hyperventilation little alters the tracing.    Intermittent photic stimulation little alters the tracing.    Stage II sleep is not attained.    Interpretation  This EEG recorded during the awake state is normal.        Ewa Rand MD

## 2024-11-20 ENCOUNTER — TELEPHONE (OUTPATIENT)
Age: 44
End: 2024-11-20

## 2024-11-20 NOTE — TELEPHONE ENCOUNTER
Pt had EEGs completed in September they say completed but there is no final results yet   Can you take a look and see if they have been resulted yet please

## 2024-12-17 ENCOUNTER — PATIENT MESSAGE (OUTPATIENT)
Age: 44
End: 2024-12-17

## 2024-12-17 NOTE — TELEPHONE ENCOUNTER
Echo 7/30/24  Stress 4/16/21  LV 10/15/24 MARILIN Henry    Future Appointments   Date Time Provider Department Center   1/31/2025  8:00 AM EEG SCHEDULE [facility]   2/11/2025  9:00 AM Flor Winkler APRN - NP [facility]   4/24/2025 11:00 AM Shayna German MD [facility]

## 2025-01-08 RX ORDER — CARVEDILOL 6.25 MG/1
6.25 TABLET ORAL 2 TIMES DAILY
Qty: 180 TABLET | Refills: 1 | Status: SHIPPED | OUTPATIENT
Start: 2025-01-08

## 2025-01-17 ENCOUNTER — APPOINTMENT (OUTPATIENT)
Facility: HOSPITAL | Age: 45
End: 2025-01-17
Payer: COMMERCIAL

## 2025-01-17 ENCOUNTER — HOSPITAL ENCOUNTER (EMERGENCY)
Facility: HOSPITAL | Age: 45
Discharge: HOME | End: 2025-01-17
Attending: EMERGENCY MEDICINE
Payer: COMMERCIAL

## 2025-01-17 ENCOUNTER — TELEPHONE (OUTPATIENT)
Age: 45
End: 2025-01-17

## 2025-01-17 VITALS
DIASTOLIC BLOOD PRESSURE: 78 MMHG | BODY MASS INDEX: 29.21 KG/M2 | HEIGHT: 62 IN | SYSTOLIC BLOOD PRESSURE: 128 MMHG | TEMPERATURE: 98.7 F | HEART RATE: 91 BPM | WEIGHT: 158.73 LBS | RESPIRATION RATE: 16 BRPM | OXYGEN SATURATION: 97 %

## 2025-01-17 DIAGNOSIS — R20.2 PARESTHESIA: ICD-10-CM

## 2025-01-17 DIAGNOSIS — R20.0 NUMBNESS: Primary | ICD-10-CM

## 2025-01-17 DIAGNOSIS — N30.00 ACUTE CYSTITIS WITHOUT HEMATURIA: ICD-10-CM

## 2025-01-17 LAB
ALBUMIN SERPL-MCNC: 3.9 G/DL (ref 3.5–5)
ALBUMIN/GLOB SERPL: 1.2 (ref 1.1–2.2)
ALP SERPL-CCNC: 139 U/L (ref 45–117)
ALT SERPL-CCNC: 28 U/L (ref 12–78)
ANION GAP SERPL CALC-SCNC: 9 MMOL/L (ref 2–12)
APPEARANCE UR: CLEAR
AST SERPL-CCNC: 14 U/L (ref 15–37)
BACTERIA URNS QL MICRO: ABNORMAL /HPF
BASOPHILS # BLD: 0.05 K/UL (ref 0–0.1)
BASOPHILS NFR BLD: 0.6 % (ref 0–1)
BILIRUB SERPL-MCNC: 0.3 MG/DL (ref 0.2–1)
BILIRUB UR QL: NEGATIVE
BUN SERPL-MCNC: 8 MG/DL (ref 6–20)
BUN/CREAT SERPL: 11 (ref 12–20)
CALCIUM SERPL-MCNC: 8.5 MG/DL (ref 8.5–10.1)
CHLORIDE SERPL-SCNC: 104 MMOL/L (ref 97–108)
CO2 SERPL-SCNC: 25 MMOL/L (ref 21–32)
COLOR UR: ABNORMAL
CREAT SERPL-MCNC: 0.73 MG/DL (ref 0.55–1.02)
DIFFERENTIAL METHOD BLD: NORMAL
EOSINOPHIL # BLD: 0.08 K/UL (ref 0–0.4)
EOSINOPHIL NFR BLD: 0.9 % (ref 0–7)
EPITH CASTS URNS QL MICRO: ABNORMAL /LPF
ERYTHROCYTE [DISTWIDTH] IN BLOOD BY AUTOMATED COUNT: 12.9 % (ref 11.5–14.5)
GLOBULIN SER CALC-MCNC: 3.2 G/DL (ref 2–4)
GLUCOSE SERPL-MCNC: 99 MG/DL (ref 65–100)
GLUCOSE UR STRIP.AUTO-MCNC: NEGATIVE MG/DL
HCT VFR BLD AUTO: 41.9 % (ref 35–47)
HGB BLD-MCNC: 14.5 G/DL (ref 11.5–16)
HGB UR QL STRIP: ABNORMAL
IMM GRANULOCYTES # BLD AUTO: 0.03 K/UL (ref 0–0.04)
IMM GRANULOCYTES NFR BLD AUTO: 0.3 % (ref 0–0.5)
KETONES UR QL STRIP.AUTO: ABNORMAL MG/DL
LEUKOCYTE ESTERASE UR QL STRIP.AUTO: ABNORMAL
LYMPHOCYTES # BLD: 1.94 K/UL (ref 0.8–3.5)
LYMPHOCYTES NFR BLD: 22.3 % (ref 12–49)
MAGNESIUM SERPL-MCNC: 1.8 MG/DL (ref 1.6–2.4)
MCH RBC QN AUTO: 28.8 PG (ref 26–34)
MCHC RBC AUTO-ENTMCNC: 34.6 G/DL (ref 30–36.5)
MCV RBC AUTO: 83.3 FL (ref 80–99)
MONOCYTES # BLD: 0.54 K/UL (ref 0–1)
MONOCYTES NFR BLD: 6.2 % (ref 5–13)
NEUTS SEG # BLD: 6.07 K/UL (ref 1.8–8)
NEUTS SEG NFR BLD: 69.7 % (ref 32–75)
NITRITE UR QL STRIP.AUTO: NEGATIVE
NRBC # BLD: 0 K/UL (ref 0–0.01)
NRBC BLD-RTO: 0 PER 100 WBC
PH UR STRIP: 6 (ref 5–8)
PLATELET # BLD AUTO: 395 K/UL (ref 150–400)
PMV BLD AUTO: 9.7 FL (ref 8.9–12.9)
POTASSIUM SERPL-SCNC: 4.1 MMOL/L (ref 3.5–5.1)
PROT SERPL-MCNC: 7.1 G/DL (ref 6.4–8.2)
PROT UR STRIP-MCNC: 30 MG/DL
RBC # BLD AUTO: 5.03 M/UL (ref 3.8–5.2)
RBC #/AREA URNS HPF: ABNORMAL /HPF (ref 0–5)
SODIUM SERPL-SCNC: 138 MMOL/L (ref 136–145)
SP GR UR REFRACTOMETRY: 1.02 (ref 1–1.03)
URINE CULTURE IF INDICATED: ABNORMAL
UROBILINOGEN UR QL STRIP.AUTO: 0.2 EU/DL (ref 0.2–1)
WBC # BLD AUTO: 8.7 K/UL (ref 3.6–11)
WBC URNS QL MICRO: ABNORMAL /HPF (ref 0–4)

## 2025-01-17 PROCEDURE — 2500000003 HC RX 250 WO HCPCS: Performed by: EMERGENCY MEDICINE

## 2025-01-17 PROCEDURE — A9579 GAD-BASE MR CONTRAST NOS,1ML: HCPCS | Performed by: EMERGENCY MEDICINE

## 2025-01-17 PROCEDURE — 80053 COMPREHEN METABOLIC PANEL: CPT

## 2025-01-17 PROCEDURE — 87086 URINE CULTURE/COLONY COUNT: CPT

## 2025-01-17 PROCEDURE — 6360000002 HC RX W HCPCS: Performed by: EMERGENCY MEDICINE

## 2025-01-17 PROCEDURE — 99285 EMERGENCY DEPT VISIT HI MDM: CPT

## 2025-01-17 PROCEDURE — 96375 TX/PRO/DX INJ NEW DRUG ADDON: CPT

## 2025-01-17 PROCEDURE — 85025 COMPLETE CBC W/AUTO DIFF WBC: CPT

## 2025-01-17 PROCEDURE — 81001 URINALYSIS AUTO W/SCOPE: CPT

## 2025-01-17 PROCEDURE — 96374 THER/PROPH/DIAG INJ IV PUSH: CPT

## 2025-01-17 PROCEDURE — 70553 MRI BRAIN STEM W/O & W/DYE: CPT

## 2025-01-17 PROCEDURE — 6360000004 HC RX CONTRAST MEDICATION: Performed by: EMERGENCY MEDICINE

## 2025-01-17 PROCEDURE — 36415 COLL VENOUS BLD VENIPUNCTURE: CPT

## 2025-01-17 PROCEDURE — 83735 ASSAY OF MAGNESIUM: CPT

## 2025-01-17 RX ADMIN — METHYLPREDNISOLONE SODIUM SUCCINATE 125 MG: 125 INJECTION, POWDER, LYOPHILIZED, FOR SOLUTION INTRAMUSCULAR; INTRAVENOUS at 14:21

## 2025-01-17 RX ADMIN — GADOTERIDOL 14 ML: 279.3 INJECTION, SOLUTION INTRAVENOUS at 12:16

## 2025-01-17 ASSESSMENT — PAIN - FUNCTIONAL ASSESSMENT: PAIN_FUNCTIONAL_ASSESSMENT: NONE - DENIES PAIN

## 2025-01-17 NOTE — TELEPHONE ENCOUNTER
Patient needs a hospital follow up appointment    Provider: KENYA Winkler  In person or virtual: Virtual  When: 2-4 weeks    Diagnosis/ Reason for follow-up: Facial and multiple extremity paresthesias.  Brain MRI +/- contrast done in ER on 1-17-25 shows few, tiny white matter changes.  No abnormal enhancement.  Recommended to ED Physician that patient schedule follow up with KENYA Winkler to discuss any further evaluation    Additional information (I.e, best phone number or family member to call, etc):     Patient

## 2025-01-17 NOTE — ED PROVIDER NOTES
EMERGENCY DEPARTMENT PHYSICIAN NOTE     Patient: Carmencita Tripathi     Time of Service: 1/17/2025 10:08 AM     Chief complaint:   Chief Complaint   Patient presents with    Numbness        HISTORY:  Patient is a 44 y.o. female who presents to the emergency department with complaints of numbness.       Past Medical History:   Diagnosis Date    Anxiety     Psoriasis     Psoriatic arthritis (HCC)     SVT (supraventricular tachycardia) (HCC)         History reviewed. No pertinent surgical history.     Family History   Problem Relation Age of Onset    Heart Disease Maternal Uncle     Hypertension Mother     Arrhythmia Sister         SVT        Social History     Socioeconomic History    Marital status:      Spouse name: None    Number of children: None    Years of education: None    Highest education level: None   Tobacco Use    Smoking status: Never     Passive exposure: Never    Smokeless tobacco: Never   Vaping Use    Vaping status: Never Used   Substance and Sexual Activity    Alcohol use: Not Currently    Drug use: Never   Social History Narrative    ** Merged History Encounter **             Current Medications: Reviewed in chart.    Allergies:   Allergies   Allergen Reactions    Latex Other (See Comments)     Blisters      Tofacitinib Anaphylaxis    Adalimumab Hives    Aspirin Hives     Other reaction(s): Unknown (comments)    Azithromycin Hives     Other reaction(s): Unknown (comments)    Infliximab Hives    Iodine Hives     Other reaction(s): Unknown (comments)    Macrobid [Nitrofurantoin] Hives    Penicillins Hives     Other reaction(s): Unknown (comments)    Procaine Hives    Prochlorperazine Other (See Comments)     Claustrophobia     Sulfa Antibiotics Hives     Other reaction(s): Unknown (comments)    Tremfya [Guselkumab] Hives     welts    Hydrocodone Nausea And Vomiting          REVIEW OF SYSTEMS: See HPI for pertinent positives and negatives.      PHYSICAL EXAM:  /78   Pulse 91   Temp

## 2025-01-17 NOTE — ED TRIAGE NOTES
Pt ambulates to bed 14 with steady gait. Pt with c/o numbness in tongue with global weakness. Dr Pelletier in to examine pt.

## 2025-01-17 NOTE — DISCHARGE INSTRUCTIONS
Routine appointments for health maintenance with a primary care provider are very important and emergency department visits are no substitute.  You should review all findings and test results from your visit today with your primary care physician.        We recommended that you take medications as prescribed.    Please go to the Neurologist office today to schedule a follow up appointment.     Return to the emergency department for any new or concerning signs/symptoms or failure to improve.

## 2025-01-18 LAB
BACTERIA SPEC CULT: NORMAL
SERVICE CMNT-IMP: NORMAL

## 2025-01-19 RX ORDER — CEFDINIR 300 MG/1
300 CAPSULE ORAL 2 TIMES DAILY
Qty: 20 CAPSULE | Refills: 0 | Status: SHIPPED | OUTPATIENT
Start: 2025-01-19 | End: 2025-01-29

## 2025-01-21 NOTE — ED NOTES
[facility] pharmacy called back.  Patient is at their pharmacy not handling their antibiotics requesting it to be changed to something else.  Patient has multiple allergies.  After discussing this with the pharmacist, they do have fosfomycin in stock so 3 g p.o. x 1 has been ordered     Yong Perez DO  01/21/25 3468

## 2025-01-30 ENCOUNTER — TELEPHONE (OUTPATIENT)
Age: 45
End: 2025-01-30

## 2025-01-30 NOTE — TELEPHONE ENCOUNTER
Patient called requesting to speak with . States she has been sharing the symptoms she has been experiencing and feels like she is getting the run around. Pt stated that she thought she was a Pt of Dr Pink's but she now has been told she has to see Np Flor Winkler. Pt stated she is confused and concerned as the symptoms are beginning to effect her vision. Pt requested call back from someone clinical/manager. Please advise. [phone]     (Pt's [employer] message sent today)  \"So I was told with the MRI of the brain that was done at [facility] that there could be a possibility it's MS and that they found an extra in vein in my brain that was undeveloped. So something is definitely going on neurological. How would we rule out MS. I mean I'm also having a little vision issue as well as far as feeling like I'm in a tunnel and seeing black specks with some blurried vision. My body doesn't feel strong when it comes to picking up things and such.\"

## 2025-01-31 ENCOUNTER — PROCEDURE VISIT (OUTPATIENT)
Age: 45
End: 2025-01-31
Payer: COMMERCIAL

## 2025-01-31 DIAGNOSIS — R56.1 SEIZURE AFTER HEAD INJURY (HCC): Primary | ICD-10-CM

## 2025-01-31 PROCEDURE — 95816 EEG AWAKE AND DROWSY: CPT | Performed by: PSYCHIATRY & NEUROLOGY

## 2025-02-05 NOTE — PROCEDURES
Procedures        [facility]   Electroencephalogram Report    Procedure ID: [id number] Procedure Date: 1/31/2025   Patient Name: Carmencita Tripathi YOB: 1980   Procedure Type: Sleep Deprived Medical Record No: [id number]       A sleep deprived EEG is requested in this 44-year-old lady to evaluate for epileptiform abnormality.  Medications listed as Skyrizi, Zofran, Fioricet, Ubrelvy, Coreg    This tracing is obtained during the awake state.    During wakefulness, there are intermittent runs of posteriorly dominant and symmetric low to medium amplitude 8 cps activities which attenuate with eye opening.  Lower voltage faster frequency activities are seen symmetrically over the anterior head regions.    Hyperventilation little alters the tracing.  2 minutes following hyperventilation the technologist from [facility] the patient starts to move her hands around and then has twitching movements.  She does not respond to calls of the technologist.  The technologist did response testing giving the patient 2 words to remember.  The patient then started to speak saying she felt foggy and she remembered the 2 words that were given.  During this time there is no change to the normal wakeful background.    Intermittent photic stimulation little alters the tracing.    Sleep is not attained.    Interpretation  This EEG recorded during the awake state is normal.  The episode of unresponsiveness and twitching has no electrographic correlate and thus is consistent with nonepileptic spell.        Ewa Rand MD

## 2025-02-11 ENCOUNTER — TELEMEDICINE (OUTPATIENT)
Age: 45
End: 2025-02-11
Payer: COMMERCIAL

## 2025-02-11 DIAGNOSIS — R90.89 ABNORMAL MRA, BRAIN: ICD-10-CM

## 2025-02-11 DIAGNOSIS — R40.4 TRANSIENT ALTERATION OF AWARENESS: ICD-10-CM

## 2025-02-11 DIAGNOSIS — R29.818 TRANSIENT NEUROLOGICAL SYMPTOMS: ICD-10-CM

## 2025-02-11 DIAGNOSIS — G43.109 MIGRAINE WITH AURA AND WITHOUT STATUS MIGRAINOSUS, NOT INTRACTABLE: Primary | ICD-10-CM

## 2025-02-11 PROCEDURE — 99214 OFFICE O/P EST MOD 30 MIN: CPT | Performed by: NURSE PRACTITIONER

## 2025-02-11 RX ORDER — HYDROCORTISONE 25 MG/G
CREAM TOPICAL
COMMUNITY
Start: 2024-12-29

## 2025-02-11 RX ORDER — FAMOTIDINE 20 MG/1
20 TABLET, FILM COATED ORAL 2 TIMES DAILY
COMMUNITY
Start: 2024-12-17

## 2025-02-11 RX ORDER — ERGOCALCIFEROL 1.25 MG/1
50000 CAPSULE, LIQUID FILLED ORAL WEEKLY
COMMUNITY
Start: 2024-11-15

## 2025-02-11 RX ORDER — BUDESONIDE AND FORMOTEROL FUMARATE DIHYDRATE 160; 4.5 UG/1; UG/1
AEROSOL RESPIRATORY (INHALATION)
COMMUNITY
Start: 2024-12-17

## 2025-02-11 RX ORDER — CLOBETASOL PROPIONATE 0.5 MG/G
CREAM TOPICAL
COMMUNITY
Start: 2025-01-05

## 2025-02-11 RX ORDER — PREDNISONE 10 MG/1
TABLET ORAL
Qty: 42 TABLET | Refills: 0 | Status: SHIPPED | OUTPATIENT
Start: 2025-02-11

## 2025-02-11 NOTE — PROGRESS NOTES
[facility]  [facility]  NEUROLOGY CLINIC   [address] [place] [address]   [phone] Office   [phone] Fax           Date:  25     Name:  CARMENCITA COOK  :  1980  MRN:  [id number]     PCP:  Serafin Pepe MD    Carmencita Cook is a 44 y.o. female who was seen by synchronous (real-time) audio-video technology on 2025 for Migraine    Subjective:   The last time she was seen, she was only having 2-3 migraines per month. Now, she has been getting them 2-3 times a week. This has been in the last few months. She is able to get rid of them with two of the Fioricet.     She went to the ER due to feeling like her left face feels numb and stretching, the left hand and foot are numb and she does feel like the left side is weaker. Wanted to discuss the MRI of the brain which was done in the ER.  ER doctor went over all of the the things that the white matter changes could be to include vessel disease, MS, early vascular disease, etc. Symptoms did seem to resolve with steroids but started to come back after this was completed.     Current Outpatient Medications   Medication Sig    budesonide-formoterol (SYMBICORT) 160-4.5 MCG/ACT AERO INHALE 2 PUFFS BY MOUTH TWICE DAILY. HOLD BREATH FOR 10 SECONDS WITH EACH PUFF    clobetasol (TEMOVATE) 0.05 % cream     vitamin D (ERGOCALCIFEROL) 1.25 MG (48680 UT) CAPS capsule Take 1 capsule by mouth Once a week at 5 PM    famotidine (PEPCID) 20 MG tablet Take 1 tablet by mouth 2 times daily    hydrocortisone 2.5 % cream APPLY 1 GRAM TOPICALLY TO THE AFFECTED AREA TWICE DAILY AS NEEDED    carvedilol (COREG) 6.25 MG tablet Take 1 tablet by mouth 2 times daily    Cholecalciferol (VITAMIN D) 50 MCG (2000 UT) CAPS capsule Take by mouth    EPINEPHrine (EPIPEN) 0.3 MG/0.3ML SOAJ injection Inject 0.3 mLs into the muscle once    Risankizumab-rzaa (SKYRIZI) 150 MG/ML SOSY Inject 150 mg (1

## 2025-03-19 ENCOUNTER — TELEPHONE (OUTPATIENT)
Age: 45
End: 2025-03-19

## 2025-03-24 ENCOUNTER — PATIENT MESSAGE (OUTPATIENT)
Age: 45
End: 2025-03-24

## 2025-03-24 DIAGNOSIS — G43.109 MIGRAINE WITH AURA AND WITHOUT STATUS MIGRAINOSUS, NOT INTRACTABLE: Primary | ICD-10-CM

## 2025-03-25 ENCOUNTER — PATIENT MESSAGE (OUTPATIENT)
Age: 45
End: 2025-03-25

## 2025-03-25 RX ORDER — RIMEGEPANT SULFATE 75 MG/75MG
75 TABLET, ORALLY DISINTEGRATING ORAL EVERY OTHER DAY
Qty: 16 TABLET | Refills: 2 | Status: ACTIVE | OUTPATIENT
Start: 2025-03-25

## 2025-03-27 NOTE — TELEPHONE ENCOUNTER
Per ROSALIE Jang, NP : \"Currently on Carvedilol. I am ok with a switch to either med, propranolol may help migraines better. She should continue to monitor her HR and BP with the change.     Future Appointments   5/6/2025   11:00 AM   Flor Winkler APRN - NP     [id number]        [facility]   5/14/2025  11:20 AM   Suzanne Henry APRN * [facility]               [facility] AM[facility]\"

## 2025-04-23 ENCOUNTER — PATIENT MESSAGE (OUTPATIENT)
Age: 45
End: 2025-04-23

## 2025-04-23 NOTE — TELEPHONE ENCOUNTER
LV 10/15/24  NV needed x6 mos    Pt cx 12/26/24 Maxi  \"Other\" 2/27/25 Maxi  Provider cx 4/24/25 Maxi  \"Work/school\" cx 5/14/25

## 2025-07-03 ENCOUNTER — TELEPHONE (OUTPATIENT)
Age: 45
End: 2025-07-03

## 2025-09-06 ENCOUNTER — TRANSCRIBE ORDERS (OUTPATIENT)
Facility: HOSPITAL | Age: 45
End: 2025-09-06

## 2025-09-06 DIAGNOSIS — R59.1 LYMPHADENOPATHY: Primary | ICD-10-CM

## 2025-09-06 DIAGNOSIS — R22.32 MASS OF LEFT AXILLA: ICD-10-CM

## 2025-09-06 DIAGNOSIS — N64.4 PAIN OF LEFT BREAST: ICD-10-CM

## 2025-09-06 DIAGNOSIS — N64.4 BREAST PAIN, LEFT: ICD-10-CM
